# Patient Record
Sex: MALE | Race: WHITE | Employment: OTHER | URBAN - METROPOLITAN AREA
[De-identification: names, ages, dates, MRNs, and addresses within clinical notes are randomized per-mention and may not be internally consistent; named-entity substitution may affect disease eponyms.]

---

## 2017-05-12 ENCOUNTER — OFFICE VISIT (OUTPATIENT)
Dept: INTERNAL MEDICINE CLINIC | Facility: CLINIC | Age: 82
End: 2017-05-12

## 2017-05-12 VITALS
DIASTOLIC BLOOD PRESSURE: 70 MMHG | HEIGHT: 67 IN | OXYGEN SATURATION: 96 % | HEART RATE: 97 BPM | SYSTOLIC BLOOD PRESSURE: 150 MMHG | TEMPERATURE: 98 F | BODY MASS INDEX: 36.57 KG/M2 | WEIGHT: 233 LBS

## 2017-05-12 DIAGNOSIS — R42 VERTIGO: ICD-10-CM

## 2017-05-12 DIAGNOSIS — M72.0 DUPUYTREN'S CONTRACTURE: ICD-10-CM

## 2017-05-12 DIAGNOSIS — Z00.00 PHYSICAL EXAM, ANNUAL: Primary | ICD-10-CM

## 2017-05-12 DIAGNOSIS — I10 ESSENTIAL HYPERTENSION: ICD-10-CM

## 2017-05-12 DIAGNOSIS — Z12.5 SCREENING PSA (PROSTATE SPECIFIC ANTIGEN): ICD-10-CM

## 2017-05-12 DIAGNOSIS — J45.909 REACTIVE AIRWAY DISEASE, UNSPECIFIED ASTHMA SEVERITY, UNCOMPLICATED: ICD-10-CM

## 2017-05-12 DIAGNOSIS — C44.91 BASAL CELL CARCINOMA: ICD-10-CM

## 2017-05-12 DIAGNOSIS — M17.0 OSTEOARTHRITIS OF BOTH KNEES, UNSPECIFIED OSTEOARTHRITIS TYPE: ICD-10-CM

## 2017-05-12 DIAGNOSIS — R73.9 HYPERGLYCEMIA: ICD-10-CM

## 2017-05-12 PROCEDURE — G0439 PPPS, SUBSEQ VISIT: HCPCS | Performed by: INTERNAL MEDICINE

## 2017-05-12 RX ORDER — BENAZEPRIL HYDROCHLORIDE 20 MG/1
20 TABLET ORAL
Qty: 90 TABLET | Refills: 3 | Status: SHIPPED | OUTPATIENT
Start: 2017-05-12 | End: 2018-04-26

## 2017-05-12 RX ORDER — AMLODIPINE BESYLATE 10 MG/1
10 TABLET ORAL DAILY
Qty: 90 TABLET | Refills: 3 | Status: SHIPPED | OUTPATIENT
Start: 2017-05-12 | End: 2018-04-26

## 2017-05-12 RX ORDER — MECLIZINE HYDROCHLORIDE 25 MG/1
25 TABLET ORAL DAILY
Qty: 90 TABLET | Refills: 3 | Status: SHIPPED | OUTPATIENT
Start: 2017-05-12 | End: 2018-04-26

## 2017-05-12 RX ORDER — ALBUTEROL SULFATE 90 UG/1
2 AEROSOL, METERED RESPIRATORY (INHALATION) EVERY 6 HOURS PRN
Qty: 18 INHALER | Refills: 2 | Status: SHIPPED | OUTPATIENT
Start: 2017-05-12 | End: 2018-05-18

## 2017-05-12 NOTE — PROGRESS NOTES
Bibi Cline is a 80year old male.     HPI:   Patient presents with:  Physical: medicare annual phys      81 y/o M here for subsequent Medicare annual wellness exam;  no CP; no SOB; no headaches; no palpitation; weight has increased 11 pounds in the l months, have you lost more than 10 pounds without trying?: 2 - No    Has your appetite been poor?: No         How does the patient maintain a good energy level?: Daily Walks    How would you describe your daily physical activity?: Light    How would you de Do you have a healthcare power of ?: Yes    Do you have a living will?: Yes     Hearing Assessment (Required for AWV/SWV)    Hearing Screening    Time taken:  5/12/2017 10:30 AM   Screening Method:  Whisper Test   Whisper Test Result:  Pass orders found for this or any previous visit.  Update Immunization Activity if applicable    Pneumococcal   Orders placed or performed in visit on 05/05/15  -PNEUMOCOCCAL VACC, 13 TEMI IM    Update Immunization Activity if applicable    Hepatitis B No orders decreased appetite, diarrhea and vomiting; no melena or hematochezia  Musculoskeletal:  Negative for arthralgias or myalgias  Genitourinary:  Negative for dysuria or polyuria  Hema/Lymph:  Negative for easy bleeding and easy bruising  Integumentary:  Negat Visit:  No orders of the defined types were placed in this encounter.        Meds This Visit:    No prescriptions requested or ordered in this encounter    Imaging & Referrals:  None     5/12/2017  Bi Cat MD

## 2017-05-15 ENCOUNTER — LAB ENCOUNTER (OUTPATIENT)
Dept: LAB | Age: 82
End: 2017-05-15
Attending: INTERNAL MEDICINE
Payer: MEDICARE

## 2017-05-15 DIAGNOSIS — I10 ESSENTIAL HYPERTENSION: ICD-10-CM

## 2017-05-15 DIAGNOSIS — R73.9 HYPERGLYCEMIA: ICD-10-CM

## 2017-05-15 DIAGNOSIS — Z12.5 SCREENING PSA (PROSTATE SPECIFIC ANTIGEN): ICD-10-CM

## 2017-05-15 PROCEDURE — 83036 HEMOGLOBIN GLYCOSYLATED A1C: CPT

## 2017-05-15 PROCEDURE — 85025 COMPLETE CBC W/AUTO DIFF WBC: CPT

## 2017-05-15 PROCEDURE — 80053 COMPREHEN METABOLIC PANEL: CPT

## 2017-05-15 PROCEDURE — 36415 COLL VENOUS BLD VENIPUNCTURE: CPT

## 2017-05-15 PROCEDURE — 84443 ASSAY THYROID STIM HORMONE: CPT

## 2017-05-15 PROCEDURE — 80061 LIPID PANEL: CPT

## 2018-04-29 RX ORDER — MECLIZINE HYDROCHLORIDE 25 MG/1
TABLET ORAL
Qty: 90 TABLET | Refills: 0 | Status: SHIPPED | OUTPATIENT
Start: 2018-04-29 | End: 2018-05-18

## 2018-04-29 RX ORDER — BENAZEPRIL HYDROCHLORIDE 20 MG/1
TABLET ORAL
Qty: 90 TABLET | Refills: 0 | Status: SHIPPED | OUTPATIENT
Start: 2018-04-29 | End: 2018-05-18

## 2018-04-29 RX ORDER — AMLODIPINE BESYLATE 10 MG/1
TABLET ORAL
Qty: 90 TABLET | Refills: 0 | Status: SHIPPED | OUTPATIENT
Start: 2018-04-29 | End: 2018-05-18

## 2018-05-18 ENCOUNTER — TELEPHONE (OUTPATIENT)
Dept: INTERNAL MEDICINE CLINIC | Facility: CLINIC | Age: 83
End: 2018-05-18

## 2018-05-18 ENCOUNTER — LAB ENCOUNTER (OUTPATIENT)
Dept: LAB | Age: 83
End: 2018-05-18
Attending: INTERNAL MEDICINE
Payer: MEDICARE

## 2018-05-18 ENCOUNTER — OFFICE VISIT (OUTPATIENT)
Dept: INTERNAL MEDICINE CLINIC | Facility: CLINIC | Age: 83
End: 2018-05-18

## 2018-05-18 VITALS
HEIGHT: 67 IN | BODY MASS INDEX: 37.61 KG/M2 | HEART RATE: 96 BPM | DIASTOLIC BLOOD PRESSURE: 80 MMHG | WEIGHT: 239.63 LBS | SYSTOLIC BLOOD PRESSURE: 148 MMHG | TEMPERATURE: 99 F

## 2018-05-18 DIAGNOSIS — R73.9 HYPERGLYCEMIA: ICD-10-CM

## 2018-05-18 DIAGNOSIS — J06.9 URI, ACUTE: ICD-10-CM

## 2018-05-18 DIAGNOSIS — R42 VERTIGO: ICD-10-CM

## 2018-05-18 DIAGNOSIS — Z12.5 SCREENING PSA (PROSTATE SPECIFIC ANTIGEN): ICD-10-CM

## 2018-05-18 DIAGNOSIS — M72.0 DUPUYTREN'S CONTRACTURE: ICD-10-CM

## 2018-05-18 DIAGNOSIS — Z00.00 PHYSICAL EXAM, ANNUAL: Primary | ICD-10-CM

## 2018-05-18 DIAGNOSIS — I10 ESSENTIAL HYPERTENSION: ICD-10-CM

## 2018-05-18 DIAGNOSIS — C44.91 BASAL CELL CARCINOMA, UNSPECIFIED SITE: ICD-10-CM

## 2018-05-18 DIAGNOSIS — J45.909 REACTIVE AIRWAY DISEASE, UNSPECIFIED ASTHMA SEVERITY, UNCOMPLICATED: ICD-10-CM

## 2018-05-18 DIAGNOSIS — M17.0 OSTEOARTHRITIS OF BOTH KNEES, UNSPECIFIED OSTEOARTHRITIS TYPE: ICD-10-CM

## 2018-05-18 PROCEDURE — G0439 PPPS, SUBSEQ VISIT: HCPCS | Performed by: INTERNAL MEDICINE

## 2018-05-18 PROCEDURE — 80053 COMPREHEN METABOLIC PANEL: CPT

## 2018-05-18 PROCEDURE — 80061 LIPID PANEL: CPT

## 2018-05-18 PROCEDURE — 85025 COMPLETE CBC W/AUTO DIFF WBC: CPT

## 2018-05-18 PROCEDURE — 36415 COLL VENOUS BLD VENIPUNCTURE: CPT

## 2018-05-18 PROCEDURE — 84443 ASSAY THYROID STIM HORMONE: CPT

## 2018-05-18 RX ORDER — ALBUTEROL SULFATE 90 UG/1
2 AEROSOL, METERED RESPIRATORY (INHALATION) EVERY 6 HOURS PRN
Qty: 3 INHALER | Refills: 3 | Status: SHIPPED | OUTPATIENT
Start: 2018-05-18 | End: 2019-05-20

## 2018-05-18 RX ORDER — CEFUROXIME AXETIL 500 MG/1
500 TABLET ORAL 2 TIMES DAILY
Qty: 20 TABLET | Refills: 0 | Status: SHIPPED | OUTPATIENT
Start: 2018-05-18 | End: 2018-08-09

## 2018-05-18 RX ORDER — MECLIZINE HYDROCHLORIDE 25 MG/1
25 TABLET ORAL
Qty: 90 TABLET | Refills: 3 | Status: SHIPPED | OUTPATIENT
Start: 2018-05-18 | End: 2019-05-20

## 2018-05-18 RX ORDER — AMLODIPINE BESYLATE 10 MG/1
10 TABLET ORAL
Qty: 90 TABLET | Refills: 3 | Status: SHIPPED | OUTPATIENT
Start: 2018-05-18 | End: 2018-08-29 | Stop reason: SINTOL

## 2018-05-18 RX ORDER — BENAZEPRIL HYDROCHLORIDE 20 MG/1
20 TABLET ORAL
Qty: 90 TABLET | Refills: 3 | Status: SHIPPED | OUTPATIENT
Start: 2018-05-18 | End: 2019-05-20

## 2018-05-18 RX ORDER — ALBUTEROL SULFATE 90 UG/1
2 AEROSOL, METERED RESPIRATORY (INHALATION) EVERY 6 HOURS PRN
Qty: 3 INHALER | Refills: 3 | Status: SHIPPED | OUTPATIENT
Start: 2018-05-18 | End: 2018-05-18

## 2018-05-18 NOTE — TELEPHONE ENCOUNTER
Message received from [facility] also PA request received from them for Proair inh. Ref# [id number] # [phone]   In purple folder.

## 2018-05-18 NOTE — PROGRESS NOTES
Mckenzie Lujan is a [de-identified]year old male.     HPI:   Patient presents with:  Physical: medicare annual wellness visit  Sore Throat: since monday ( granddaughter had strep)  Medication Request: pending      [de-identified] y/o M here for subsequent Medicare annual wellnes Disp:  Rfl:        Allergies:    Pollen                      Comment:Seasonal allergies        General Health     In the past six months, have you lost more than 10 pounds without trying?: 2 - No    Has your appetite been poor?: No    Type of Diet: Balance Acuity     Right Eye Visual Acuity: Corrected Left Eye Visual Acuity: Corrected   Right Eye Chart Acuity: 20/30 Left Eye Chart Acuity: 20/30     Cognitive Assessment     What day of the week is this?: Correct    What month is it?: Correct    What year is i Pneumococcal   Orders placed or performed in visit on 05/05/15  -PNEUMOCOCCAL VACC, 13 TEMI IM    Update Immunization Activity if applicable    Hepatitis B No orders found for this or any previous visit.  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

## 2018-05-20 ENCOUNTER — APPOINTMENT (OUTPATIENT)
Dept: LAB | Age: 83
End: 2018-05-20
Attending: INTERNAL MEDICINE
Payer: MEDICARE

## 2018-05-20 DIAGNOSIS — Z53.20 COLONOSCOPY REFUSED: ICD-10-CM

## 2018-05-20 DIAGNOSIS — Z12.11 ENCOUNTER FOR SCREENING FOR MALIGNANT NEOPLASM OF COLON: ICD-10-CM

## 2018-05-20 PROCEDURE — 82270 OCCULT BLOOD FECES: CPT

## 2018-05-22 ENCOUNTER — TELEPHONE (OUTPATIENT)
Dept: INTERNAL MEDICINE CLINIC | Facility: CLINIC | Age: 83
End: 2018-05-22

## 2018-05-22 DIAGNOSIS — Z12.11 ENCOUNTER FOR SCREENING FOR MALIGNANT NEOPLASM OF COLON: ICD-10-CM

## 2018-05-22 DIAGNOSIS — Z53.20 COLONOSCOPY REFUSED: Primary | ICD-10-CM

## 2018-05-22 NOTE — TELEPHONE ENCOUNTER
Patient dropped off 1 OB card. Patient was seen in the office by Dr. Rolly Yagn 5/18/18 in which he indicates card was given d/t patient wishing to forego colonoscopy. Order entered and specimen sent to lab.

## 2018-08-06 ENCOUNTER — TELEPHONE (OUTPATIENT)
Dept: INTERNAL MEDICINE CLINIC | Facility: CLINIC | Age: 83
End: 2018-08-06

## 2018-08-06 NOTE — TELEPHONE ENCOUNTER
Pt calling to let  Carinkarishma Tovar know that he has gained some weight, is winded easily lately, and his ankles and legs are getting swollen. Pt wanted to see if  Carinkarishma Tovar would like him to go on a water pill or some other form of medication to help.  Best call back is [phone]

## 2018-08-06 NOTE — TELEPHONE ENCOUNTER
To Dr. Dada Petty - see below - pt states sx have gotten worse in the last week. Has gained about 5 lbs since last visit. Is able to easily fit in shoes, \"just slightly swollen\" - \"wife has me wearing compression stockings\".   Pt states he gets winded if

## 2018-08-07 NOTE — TELEPHONE ENCOUNTER
Dr. Leslie Lot message relayed to pt who verbalized understanding. Appt made for 8:30AM on Thursday, August 9.

## 2018-08-09 ENCOUNTER — OFFICE VISIT (OUTPATIENT)
Dept: INTERNAL MEDICINE CLINIC | Facility: CLINIC | Age: 83
End: 2018-08-09
Payer: MEDICARE

## 2018-08-09 VITALS
HEIGHT: 68 IN | DIASTOLIC BLOOD PRESSURE: 80 MMHG | TEMPERATURE: 99 F | HEART RATE: 95 BPM | BODY MASS INDEX: 37.89 KG/M2 | SYSTOLIC BLOOD PRESSURE: 146 MMHG | WEIGHT: 250 LBS

## 2018-08-09 DIAGNOSIS — I10 ESSENTIAL HYPERTENSION: ICD-10-CM

## 2018-08-09 DIAGNOSIS — R60.0 BILATERAL LOWER EXTREMITY EDEMA: Primary | ICD-10-CM

## 2018-08-09 DIAGNOSIS — R73.9 HYPERGLYCEMIA: ICD-10-CM

## 2018-08-09 PROCEDURE — G0463 HOSPITAL OUTPT CLINIC VISIT: HCPCS | Performed by: INTERNAL MEDICINE

## 2018-08-09 PROCEDURE — 99214 OFFICE O/P EST MOD 30 MIN: CPT | Performed by: INTERNAL MEDICINE

## 2018-08-09 RX ORDER — HYDROCHLOROTHIAZIDE 25 MG/1
25 TABLET ORAL DAILY
Qty: 30 TABLET | Refills: 5 | Status: SHIPPED | OUTPATIENT
Start: 2018-08-09 | End: 2018-10-07

## 2018-08-09 RX ORDER — METOPROLOL SUCCINATE 50 MG/1
50 TABLET, EXTENDED RELEASE ORAL DAILY
Qty: 30 TABLET | Refills: 5 | Status: SHIPPED | OUTPATIENT
Start: 2018-08-09 | End: 2018-10-07

## 2018-08-09 NOTE — PROGRESS NOTES
Eddie Nephew is a [de-identified]year old male. HPI:   Patient presents with:  Edema: Pt complains of bilateral leg and foot edema. His lower legs are red.        [de-identified] y/o M with HTN here for F/U; presents with BLE edema; has gained 11 pounds in 3 months;  no CP; OR 1 TABLET DAILY Disp:  Rfl:        Allergies:    Pollen                      Comment:Seasonal allergies              ROS:   Constitutional: no weight loss; no fatigue  Cardiovascular:  Negative for chest pain; negative palpitations  Respiratory:  Negativ now     PSA screening  PSA 1.2 in May 2017; would advise against PSA screening due to advanced age          RTC 3 weeks                  Orders This Visit:  No orders of the defined types were placed in this encounter.       Meds This Visit:    No prescript

## 2018-08-24 ENCOUNTER — APPOINTMENT (OUTPATIENT)
Dept: LAB | Age: 83
End: 2018-08-24
Attending: INTERNAL MEDICINE
Payer: MEDICARE

## 2018-08-24 DIAGNOSIS — R73.9 HYPERGLYCEMIA: ICD-10-CM

## 2018-08-24 LAB — HBA1C MFR BLD: 5.9 % (ref 4–6)

## 2018-08-24 PROCEDURE — 36415 COLL VENOUS BLD VENIPUNCTURE: CPT

## 2018-08-24 PROCEDURE — 83036 HEMOGLOBIN GLYCOSYLATED A1C: CPT

## 2018-08-30 ENCOUNTER — OFFICE VISIT (OUTPATIENT)
Dept: INTERNAL MEDICINE CLINIC | Facility: CLINIC | Age: 83
End: 2018-08-30
Payer: MEDICARE

## 2018-08-30 VITALS
BODY MASS INDEX: 36.28 KG/M2 | OXYGEN SATURATION: 97 % | WEIGHT: 239.38 LBS | SYSTOLIC BLOOD PRESSURE: 140 MMHG | HEIGHT: 68 IN | DIASTOLIC BLOOD PRESSURE: 78 MMHG | TEMPERATURE: 98 F | HEART RATE: 85 BPM

## 2018-08-30 DIAGNOSIS — R73.9 HYPERGLYCEMIA: ICD-10-CM

## 2018-08-30 DIAGNOSIS — J45.909 REACTIVE AIRWAY DISEASE, UNSPECIFIED ASTHMA SEVERITY, UNCOMPLICATED: ICD-10-CM

## 2018-08-30 DIAGNOSIS — I10 ESSENTIAL HYPERTENSION: ICD-10-CM

## 2018-08-30 DIAGNOSIS — R60.0 BILATERAL LOWER EXTREMITY EDEMA: Primary | ICD-10-CM

## 2018-08-30 PROCEDURE — G0463 HOSPITAL OUTPT CLINIC VISIT: HCPCS | Performed by: INTERNAL MEDICINE

## 2018-08-30 PROCEDURE — 99214 OFFICE O/P EST MOD 30 MIN: CPT | Performed by: INTERNAL MEDICINE

## 2018-08-30 NOTE — PROGRESS NOTES
Harmony Escobar is a [de-identified]year old male. HPI:   Patient presents with:   Follow - Up: 3 week BLE edema       [de-identified] y/o M with HTN, LE edema here for F/U; leg edema resolved after stopping amlodipine;  no CP; no SOB; no headaches; no palpitation         HIST hours as needed for Wheezing.  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

## 2018-10-07 RX ORDER — BENAZEPRIL HYDROCHLORIDE 20 MG/1
20 TABLET ORAL
Qty: 90 TABLET | Refills: 3 | Status: CANCELLED | OUTPATIENT
Start: 2018-10-07

## 2018-10-07 RX ORDER — MECLIZINE HYDROCHLORIDE 25 MG/1
25 TABLET ORAL
Qty: 90 TABLET | Refills: 3 | Status: CANCELLED | OUTPATIENT
Start: 2018-10-07

## 2018-10-08 RX ORDER — METOPROLOL SUCCINATE 50 MG/1
50 TABLET, EXTENDED RELEASE ORAL DAILY
Qty: 90 TABLET | Refills: 3 | Status: SHIPPED | OUTPATIENT
Start: 2018-10-08 | End: 2019-11-20

## 2018-10-08 RX ORDER — HYDROCHLOROTHIAZIDE 25 MG/1
25 TABLET ORAL DAILY
Qty: 90 TABLET | Refills: 3 | Status: SHIPPED | OUTPATIENT
Start: 2018-10-08 | End: 2019-11-20

## 2018-12-14 RX ORDER — HYDROCHLOROTHIAZIDE 25 MG/1
TABLET ORAL
Qty: 30 TABLET | Refills: 4 | OUTPATIENT
Start: 2018-12-14

## 2018-12-14 RX ORDER — METOPROLOL SUCCINATE 50 MG/1
TABLET, EXTENDED RELEASE ORAL
Qty: 30 TABLET | Refills: 4 | OUTPATIENT
Start: 2018-12-14

## 2018-12-14 NOTE — TELEPHONE ENCOUNTER
Refill requests received for HCTZ 25mg and metoprolol 25mg from [facility]. Refill authorization sent 10/8/18 for both medications #90 with 3 refills to [facility]. Request is too early per protocol.

## 2019-02-22 ENCOUNTER — OFFICE VISIT (OUTPATIENT)
Dept: INTERNAL MEDICINE CLINIC | Facility: CLINIC | Age: 84
End: 2019-02-22
Payer: MEDICARE

## 2019-02-22 VITALS
WEIGHT: 245 LBS | SYSTOLIC BLOOD PRESSURE: 144 MMHG | BODY MASS INDEX: 37.13 KG/M2 | HEIGHT: 68 IN | RESPIRATION RATE: 18 BRPM | TEMPERATURE: 98 F | HEART RATE: 91 BPM | DIASTOLIC BLOOD PRESSURE: 70 MMHG | OXYGEN SATURATION: 94 %

## 2019-02-22 DIAGNOSIS — R60.0 BILATERAL LOWER EXTREMITY EDEMA: Primary | ICD-10-CM

## 2019-02-22 DIAGNOSIS — I10 ESSENTIAL HYPERTENSION: ICD-10-CM

## 2019-02-22 DIAGNOSIS — R25.1 TREMOR OF BOTH HANDS: ICD-10-CM

## 2019-02-22 DIAGNOSIS — R73.9 HYPERGLYCEMIA: ICD-10-CM

## 2019-02-22 PROCEDURE — G0463 HOSPITAL OUTPT CLINIC VISIT: HCPCS | Performed by: INTERNAL MEDICINE

## 2019-02-22 PROCEDURE — 99214 OFFICE O/P EST MOD 30 MIN: CPT | Performed by: INTERNAL MEDICINE

## 2019-02-22 PROCEDURE — G0009 ADMIN PNEUMOCOCCAL VACCINE: HCPCS | Performed by: INTERNAL MEDICINE

## 2019-02-22 PROCEDURE — 90732 PPSV23 VACC 2 YRS+ SUBQ/IM: CPT | Performed by: INTERNAL MEDICINE

## 2019-02-22 RX ORDER — A/SINGAPORE/GP1908/2015 IVR-180 (H1N1) (AN A/MICHIGAN/45/2015 (H1N1)PDM09-LIKE VIRUS), A/HONG KONG/4801/2014, NYMC X-263B (H3N2) (AN A/HONG KONG/4801/2014-LIKE VIRUS), AND B/BRISBANE/60/2008, WILD TYPE (A B/BRISBANE/60/2008-LIKE VIRUS) 15; 15; 15 UG/.5ML; UG/.5ML; UG/.5ML
INJECTION, SUSPENSION INTRAMUSCULAR
Refills: 0 | COMMUNITY
Start: 2018-10-02 | End: 2019-02-22 | Stop reason: ALTCHOICE

## 2019-02-22 NOTE — PROGRESS NOTES
Cesario Zelaya is a 82year old male. HPI:   Patient presents with: Follow - Up: 6 month follow up, patient states both his has been shaking in past 3 months off/on while doing thing. Denies numbness or pain.        83 y/o M with c/o hand tremors;  n tablet (20 mg total) by mouth once daily. Disp: 90 tablet Rfl: 3   Albuterol Sulfate  (90 Base) MCG/ACT Inhalation Aero Soln Inhale 2 puffs into the lungs every 6 (six) hours as needed for Wheezing.  Disp: 3 Inhaler Rfl: 3   BABY ASPIRIN OR 1 TABLET prn     Basal cell carcinoma  Of nose, and right neck area; s/p s/p Mohs for 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

## 2019-05-13 ENCOUNTER — LAB ENCOUNTER (OUTPATIENT)
Dept: LAB | Age: 84
End: 2019-05-13
Attending: INTERNAL MEDICINE
Payer: MEDICARE

## 2019-05-13 DIAGNOSIS — I10 ESSENTIAL HYPERTENSION: ICD-10-CM

## 2019-05-13 DIAGNOSIS — R73.9 HYPERGLYCEMIA: ICD-10-CM

## 2019-05-13 PROCEDURE — 80053 COMPREHEN METABOLIC PANEL: CPT

## 2019-05-13 PROCEDURE — 85025 COMPLETE CBC W/AUTO DIFF WBC: CPT

## 2019-05-13 PROCEDURE — 36415 COLL VENOUS BLD VENIPUNCTURE: CPT

## 2019-05-13 PROCEDURE — 80061 LIPID PANEL: CPT

## 2019-05-13 PROCEDURE — 83036 HEMOGLOBIN GLYCOSYLATED A1C: CPT

## 2019-05-13 PROCEDURE — 84443 ASSAY THYROID STIM HORMONE: CPT

## 2019-05-20 ENCOUNTER — OFFICE VISIT (OUTPATIENT)
Dept: INTERNAL MEDICINE CLINIC | Facility: CLINIC | Age: 84
End: 2019-05-20
Payer: MEDICARE

## 2019-05-20 VITALS
DIASTOLIC BLOOD PRESSURE: 86 MMHG | BODY MASS INDEX: 38.02 KG/M2 | OXYGEN SATURATION: 93 % | TEMPERATURE: 98 F | HEIGHT: 67.7 IN | SYSTOLIC BLOOD PRESSURE: 144 MMHG | WEIGHT: 248 LBS | HEART RATE: 88 BPM

## 2019-05-20 DIAGNOSIS — N18.30 CKD (CHRONIC KIDNEY DISEASE), STAGE III (HCC): ICD-10-CM

## 2019-05-20 DIAGNOSIS — Z12.5 SCREENING PSA (PROSTATE SPECIFIC ANTIGEN): ICD-10-CM

## 2019-05-20 DIAGNOSIS — R42 VERTIGO: ICD-10-CM

## 2019-05-20 DIAGNOSIS — M72.0 DUPUYTREN'S CONTRACTURE: ICD-10-CM

## 2019-05-20 DIAGNOSIS — R60.0 BILATERAL LOWER EXTREMITY EDEMA: ICD-10-CM

## 2019-05-20 DIAGNOSIS — R73.9 HYPERGLYCEMIA: ICD-10-CM

## 2019-05-20 DIAGNOSIS — I10 ESSENTIAL HYPERTENSION: ICD-10-CM

## 2019-05-20 DIAGNOSIS — Z00.00 PHYSICAL EXAM, ANNUAL: Primary | ICD-10-CM

## 2019-05-20 DIAGNOSIS — M17.0 OSTEOARTHRITIS OF BOTH KNEES, UNSPECIFIED OSTEOARTHRITIS TYPE: ICD-10-CM

## 2019-05-20 DIAGNOSIS — E04.1 LEFT THYROID NODULE: ICD-10-CM

## 2019-05-20 DIAGNOSIS — J45.909 REACTIVE AIRWAY DISEASE, UNSPECIFIED ASTHMA SEVERITY, UNCOMPLICATED: ICD-10-CM

## 2019-05-20 DIAGNOSIS — R25.1 TREMOR OF BOTH HANDS: ICD-10-CM

## 2019-05-20 PROCEDURE — G0439 PPPS, SUBSEQ VISIT: HCPCS | Performed by: INTERNAL MEDICINE

## 2019-05-20 RX ORDER — BENAZEPRIL HYDROCHLORIDE 20 MG/1
20 TABLET ORAL
Qty: 90 TABLET | Refills: 3 | Status: SHIPPED | OUTPATIENT
Start: 2019-05-20 | End: 2020-01-31

## 2019-05-20 RX ORDER — FUROSEMIDE 20 MG/1
20 TABLET ORAL DAILY PRN
Qty: 30 TABLET | Refills: 2 | Status: ON HOLD | OUTPATIENT
Start: 2019-05-20 | End: 2020-01-24

## 2019-05-20 RX ORDER — MECLIZINE HYDROCHLORIDE 25 MG/1
25 TABLET ORAL
Qty: 90 TABLET | Refills: 3 | Status: SHIPPED | OUTPATIENT
Start: 2019-05-20 | End: 2020-05-18

## 2019-05-20 RX ORDER — ALBUTEROL SULFATE 90 UG/1
2 AEROSOL, METERED RESPIRATORY (INHALATION) EVERY 6 HOURS PRN
Qty: 1 INHALER | Refills: 5 | Status: SHIPPED | OUTPATIENT
Start: 2019-05-20 | End: 2020-05-26

## 2019-05-20 NOTE — PROGRESS NOTES
Jostin Mak is a 82year old male. HPI:   Patient presents with:  Physical: Medicare annual. Patient reports he has been noticing that his hands shake when he is eating meals or when he has to hold objects steady.    Swelling: Patient reports he ha Tablet 24 Hr Take 1 tablet (50 mg total) by mouth daily. Disp: 90 tablet Rfl: 3   Meclizine HCl 25 MG Oral Tab Take 1 tablet (25 mg total) by mouth once daily.  Disp: 90 tablet Rfl: 3   Benazepril HCl 20 MG Oral Tab Take 1 tablet (20 mg total) by mouth once 12 months?: 0-No    Do you accidently lose urine?: (P) 0-No    Do you have difficulty seeing?: (P) 0-No    Do you have any difficulty walking or getting up?: (P) 1-Yes    Do you have any tripping hazards?: (P) 0-No    Are you on multiple medications?: (P) Visual Acuity     Right Eye Visual Acuity: Corrected Left Eye Visual Acuity: Corrected   Right Eye Chart Acuity: 20/40 Left Eye Chart Acuity: 20/30     Cognitive Assessment     What day of the week is this?: Correct    What month is it?: Correct    W PNEUMOCOCCAL VACC, 13 TEMI IM    Update Immunization Activity if applicable    Hepatitis B No orders found for this or any previous visit. Update Immunization Activity if applicable    Tetanus No orders found for this or any previous visit.  Update Immunizat myalgias  Genitourinary:  Negative for dysuria or polyuria  Hema/Lymph:  Negative for easy bleeding and easy bruising  Integumentary:  Negative for pruritus and rash  Neurological:  Negative for gait disturbance; negative for paresthesias   All other revie seasonal bronchospasm for which he takes albuterol HFA 2 p QID prn     Basal cell carcinoma  Of nose, and right neck area; s/p s/p Mohs for [address] done 8/28/17 with resection by dermatologist Dr Bradley Higurea's contracture  Of both hands; monitor for no

## 2019-05-23 ENCOUNTER — HOSPITAL ENCOUNTER (OUTPATIENT)
Dept: ULTRASOUND IMAGING | Facility: HOSPITAL | Age: 84
Discharge: HOME | End: 2019-05-23
Attending: INTERNAL MEDICINE
Payer: MEDICARE

## 2019-05-23 DIAGNOSIS — E04.1 LEFT THYROID NODULE: ICD-10-CM

## 2019-05-23 PROCEDURE — 76536 US EXAM OF HEAD AND NECK: CPT | Performed by: INTERNAL MEDICINE

## 2019-05-24 ENCOUNTER — TELEPHONE (OUTPATIENT)
Dept: INTERNAL MEDICINE CLINIC | Facility: CLINIC | Age: 84
End: 2019-05-24

## 2019-05-24 NOTE — TELEPHONE ENCOUNTER
Thyroid U/S on 5/23/19 shows Benign-appearing bilateral subcentimeter cystic thyroid nodules; pt notified

## 2019-11-08 RX ORDER — MECLIZINE HYDROCHLORIDE 25 MG/1
25 TABLET ORAL
Qty: 90 TABLET | Refills: 3 | OUTPATIENT
Start: 2019-11-08

## 2019-11-08 NOTE — TELEPHONE ENCOUNTER
Current refill request refused due to refill is either a duplicate request or has active refills at the pharmacy. Check previous templates.     Requested Prescriptions     Refused Prescriptions Disp Refills   • Meclizine HCl 25 MG Oral Tab 90 tablet 3

## 2019-11-20 ENCOUNTER — OFFICE VISIT (OUTPATIENT)
Dept: INTERNAL MEDICINE CLINIC | Facility: CLINIC | Age: 84
End: 2019-11-20
Payer: MEDICARE

## 2019-11-20 ENCOUNTER — APPOINTMENT (OUTPATIENT)
Dept: LAB | Age: 84
End: 2019-11-20
Attending: INTERNAL MEDICINE
Payer: MEDICARE

## 2019-11-20 VITALS
OXYGEN SATURATION: 97 % | HEART RATE: 85 BPM | WEIGHT: 249.81 LBS | HEIGHT: 67.7 IN | DIASTOLIC BLOOD PRESSURE: 74 MMHG | SYSTOLIC BLOOD PRESSURE: 130 MMHG | BODY MASS INDEX: 38.3 KG/M2 | TEMPERATURE: 98 F

## 2019-11-20 DIAGNOSIS — I10 ESSENTIAL HYPERTENSION: ICD-10-CM

## 2019-11-20 DIAGNOSIS — E04.1 LEFT THYROID NODULE: ICD-10-CM

## 2019-11-20 DIAGNOSIS — N18.30 CKD (CHRONIC KIDNEY DISEASE), STAGE III (HCC): ICD-10-CM

## 2019-11-20 DIAGNOSIS — R73.9 HYPERGLYCEMIA: ICD-10-CM

## 2019-11-20 DIAGNOSIS — R60.0 BILATERAL LOWER EXTREMITY EDEMA: Primary | ICD-10-CM

## 2019-11-20 DIAGNOSIS — R25.1 TREMOR OF BOTH HANDS: ICD-10-CM

## 2019-11-20 DIAGNOSIS — M17.0 OSTEOARTHRITIS OF BOTH KNEES, UNSPECIFIED OSTEOARTHRITIS TYPE: ICD-10-CM

## 2019-11-20 PROCEDURE — 90662 IIV NO PRSV INCREASED AG IM: CPT | Performed by: INTERNAL MEDICINE

## 2019-11-20 PROCEDURE — 36415 COLL VENOUS BLD VENIPUNCTURE: CPT

## 2019-11-20 PROCEDURE — 83036 HEMOGLOBIN GLYCOSYLATED A1C: CPT

## 2019-11-20 PROCEDURE — G0463 HOSPITAL OUTPT CLINIC VISIT: HCPCS | Performed by: INTERNAL MEDICINE

## 2019-11-20 PROCEDURE — G0008 ADMIN INFLUENZA VIRUS VAC: HCPCS | Performed by: INTERNAL MEDICINE

## 2019-11-20 PROCEDURE — 99214 OFFICE O/P EST MOD 30 MIN: CPT | Performed by: INTERNAL MEDICINE

## 2019-11-20 PROCEDURE — 80048 BASIC METABOLIC PNL TOTAL CA: CPT

## 2019-11-20 RX ORDER — HYDROCHLOROTHIAZIDE 25 MG/1
25 TABLET ORAL DAILY
Qty: 90 TABLET | Refills: 3 | Status: ON HOLD | OUTPATIENT
Start: 2019-11-20 | End: 2020-01-24

## 2019-11-20 RX ORDER — METOPROLOL SUCCINATE 50 MG/1
50 TABLET, EXTENDED RELEASE ORAL DAILY
Qty: 90 TABLET | Refills: 3 | Status: SHIPPED | OUTPATIENT
Start: 2019-11-20 | End: 2020-11-02

## 2019-11-20 NOTE — PROGRESS NOTES
Troy Amin is a 82year old male. HPI:   Patient presents with:  Checkup: 6 month. c/o burning to feet/toes when bed sheets toes skin, onset: 1 month. Denies tingling/numbness.        81 y/o M with Benign essential hypertension here for F/U;  on   puffs into the lungs every 6 (six) hours as needed for Wheezing. 1 Inhaler 5   • Meclizine HCl 25 MG Oral Tab Take 1 tablet (25 mg total) by mouth once daily. 90 tablet 3   • Benazepril HCl 20 MG Oral Tab Take 1 tablet (20 mg total) by mouth once daily.  82 Aleve 220 mg po BID; pt is reluctant to pursue TKR     Hyperglycemia  ; A1C 6.0% in May 2019; +FHx DM (mother); re-check A1C      Reactive airway disease  Has seasonal bronchospasm for which he takes albuterol HFA 2 p QID prn     Basal cell carcinom

## 2020-01-13 ENCOUNTER — TELEPHONE (OUTPATIENT)
Dept: INTERNAL MEDICINE CLINIC | Facility: CLINIC | Age: 85
End: 2020-01-13

## 2020-01-13 RX ORDER — METHYLPREDNISOLONE 4 MG/1
TABLET ORAL
Qty: 21 TABLET | Refills: 0 | Status: SHIPPED | OUTPATIENT
Start: 2020-01-13 | End: 2020-01-22

## 2020-01-13 NOTE — TELEPHONE ENCOUNTER
Pt. Is having a gout flare up that moved from his feet to his hand pt. Would like to be prescribed something ph.  # [phone]   [facility] # [phone]  Routed to clinical

## 2020-01-13 NOTE — TELEPHONE ENCOUNTER
I spoke with patient and he has had gout pain in his hand, index finger and middle finger for past 4 days. Usually he has gout symptoms in his feet and this is new for him. He denies any injury to hand. Fingers look red and inflamed to him.  He usually [place]

## 2020-01-13 NOTE — TELEPHONE ENCOUNTER
Called and Relayed MD's message to patient---verbalized understanding. Informed that RX has been sent to his pharmacy.

## 2020-01-22 ENCOUNTER — OFFICE VISIT (OUTPATIENT)
Dept: INTERNAL MEDICINE CLINIC | Facility: CLINIC | Age: 85
End: 2020-01-22
Payer: MEDICARE

## 2020-01-22 ENCOUNTER — APPOINTMENT (OUTPATIENT)
Dept: LAB | Age: 85
End: 2020-01-22
Attending: INTERNAL MEDICINE
Payer: MEDICARE

## 2020-01-22 VITALS
OXYGEN SATURATION: 94 % | SYSTOLIC BLOOD PRESSURE: 104 MMHG | TEMPERATURE: 98 F | HEART RATE: 92 BPM | WEIGHT: 250 LBS | DIASTOLIC BLOOD PRESSURE: 60 MMHG | BODY MASS INDEX: 38.33 KG/M2 | HEIGHT: 67.7 IN

## 2020-01-22 DIAGNOSIS — M10.9 ACUTE GOUT OF RIGHT HAND, UNSPECIFIED CAUSE: Primary | ICD-10-CM

## 2020-01-22 DIAGNOSIS — M10.9 ACUTE GOUT OF RIGHT HAND, UNSPECIFIED CAUSE: ICD-10-CM

## 2020-01-22 DIAGNOSIS — N18.30 CKD (CHRONIC KIDNEY DISEASE), STAGE III (HCC): ICD-10-CM

## 2020-01-22 DIAGNOSIS — I10 ESSENTIAL HYPERTENSION: ICD-10-CM

## 2020-01-22 LAB — URATE SERPL-MCNC: 11.7 MG/DL (ref 3.5–7.2)

## 2020-01-22 PROCEDURE — 99214 OFFICE O/P EST MOD 30 MIN: CPT | Performed by: INTERNAL MEDICINE

## 2020-01-22 PROCEDURE — 84550 ASSAY OF BLOOD/URIC ACID: CPT

## 2020-01-22 PROCEDURE — 36415 COLL VENOUS BLD VENIPUNCTURE: CPT

## 2020-01-22 PROCEDURE — G0463 HOSPITAL OUTPT CLINIC VISIT: HCPCS | Performed by: INTERNAL MEDICINE

## 2020-01-22 NOTE — PROGRESS NOTES
Mason Kaur is a 82year old male. HPI:   Patient presents with:   Follow - Up: prednisone helped for gout in hands      83 y/o M who developed acute gout to DIP of right index finger about 10 d ago; he took Medrol Dose loraine x 6d with marked lesseni Sulfate  (90 Base) MCG/ACT Inhalation Aero Soln Inhale 2 puffs into the lungs every 6 (six) hours as needed for Wheezing. 1 Inhaler 5   • Meclizine HCl 25 MG Oral Tab Take 1 tablet (25 mg total) by mouth once daily.  90 tablet 3   • Benazepril HCl 20 tremor     BPV  uses meclizine 25 mg po qD on regular basis     OA knees  Takes Aleve 220 mg po BID; pt is reluctant to pursue TKR     Hyperglycemia  F7BS 129; A1C 6.0% in May 2019; +FHx DM (mother); re-check A1C      Reactive airway disease  Has seasonal

## 2020-01-23 ENCOUNTER — APPOINTMENT (OUTPATIENT)
Dept: GENERAL RADIOLOGY | Facility: HOSPITAL | Age: 85
End: 2020-01-23
Attending: EMERGENCY MEDICINE
Payer: MEDICARE

## 2020-01-23 ENCOUNTER — HOSPITAL ENCOUNTER (OUTPATIENT)
Facility: HOSPITAL | Age: 85
Setting detail: OBSERVATION
LOS: 1 days | Discharge: HOME | End: 2020-01-24
Attending: EMERGENCY MEDICINE | Admitting: INTERNAL MEDICINE
Payer: MEDICARE

## 2020-01-23 ENCOUNTER — APPOINTMENT (OUTPATIENT)
Dept: CT IMAGING | Facility: HOSPITAL | Age: 85
End: 2020-01-23
Attending: EMERGENCY MEDICINE
Payer: MEDICARE

## 2020-01-23 DIAGNOSIS — N18.30 CKD (CHRONIC KIDNEY DISEASE), STAGE III (HCC): ICD-10-CM

## 2020-01-23 DIAGNOSIS — R07.9 CHEST PAIN WITH HIGH RISK FOR CARDIAC ETIOLOGY: Primary | ICD-10-CM

## 2020-01-23 DIAGNOSIS — J40 BRONCHITIS: ICD-10-CM

## 2020-01-23 PROBLEM — E87.3 METABOLIC ALKALOSIS: Status: ACTIVE | Noted: 2020-01-23

## 2020-01-23 PROBLEM — D69.6 THROMBOCYTOPENIA (HCC): Status: ACTIVE | Noted: 2020-01-23

## 2020-01-23 LAB
ANION GAP SERPL CALC-SCNC: 3 MMOL/L (ref 0–18)
BASOPHILS # BLD AUTO: 0.05 X10(3) UL (ref 0–0.2)
BASOPHILS NFR BLD AUTO: 0.6 %
BUN BLD-MCNC: 28 MG/DL (ref 7–18)
BUN/CREAT SERPL: 16.8 (ref 10–20)
CALCIUM BLD-MCNC: 8.6 MG/DL (ref 8.5–10.1)
CHLORIDE SERPL-SCNC: 102 MMOL/L (ref 98–112)
CO2 SERPL-SCNC: 34 MMOL/L (ref 21–32)
CREAT BLD-MCNC: 1.67 MG/DL (ref 0.7–1.3)
D DIMER PPP FEU-MCNC: 1.67 UG/ML FEU (ref ?–0.82)
DEPRECATED RDW RBC AUTO: 45.3 FL (ref 35.1–46.3)
EOSINOPHIL # BLD AUTO: 0.15 X10(3) UL (ref 0–0.7)
EOSINOPHIL NFR BLD AUTO: 1.7 %
ERYTHROCYTE [DISTWIDTH] IN BLOOD BY AUTOMATED COUNT: 12.7 % (ref 11–15)
GLUCOSE BLD-MCNC: 149 MG/DL (ref 70–99)
HCT VFR BLD AUTO: 43.6 % (ref 39–53)
HGB BLD-MCNC: 14.9 G/DL (ref 13–17.5)
IMM GRANULOCYTES # BLD AUTO: 0.03 X10(3) UL (ref 0–1)
IMM GRANULOCYTES NFR BLD: 0.3 %
LYMPHOCYTES # BLD AUTO: 2.1 X10(3) UL (ref 1–4)
LYMPHOCYTES NFR BLD AUTO: 24.1 %
MCH RBC QN AUTO: 33.2 PG (ref 26–34)
MCHC RBC AUTO-ENTMCNC: 34.2 G/DL (ref 31–37)
MCV RBC AUTO: 97.1 FL (ref 80–100)
MONOCYTES # BLD AUTO: 0.76 X10(3) UL (ref 0.1–1)
MONOCYTES NFR BLD AUTO: 8.7 %
NEUTROPHILS # BLD AUTO: 5.61 X10 (3) UL (ref 1.5–7.7)
NEUTROPHILS # BLD AUTO: 5.61 X10(3) UL (ref 1.5–7.7)
NEUTROPHILS NFR BLD AUTO: 64.6 %
OSMOLALITY SERPL CALC.SUM OF ELEC: 296 MOSM/KG (ref 275–295)
PLATELET # BLD AUTO: 143 10(3)UL (ref 150–450)
POTASSIUM SERPL-SCNC: 4.4 MMOL/L (ref 3.5–5.1)
RBC # BLD AUTO: 4.49 X10(6)UL (ref 3.8–5.8)
SODIUM SERPL-SCNC: 139 MMOL/L (ref 136–145)
TROPONIN I SERPL-MCNC: <0.045 NG/ML (ref ?–0.04)
TROPONIN I SERPL-MCNC: <0.045 NG/ML (ref ?–0.04)
WBC # BLD AUTO: 8.7 X10(3) UL (ref 4–11)

## 2020-01-23 PROCEDURE — 71046 X-RAY EXAM CHEST 2 VIEWS: CPT | Performed by: EMERGENCY MEDICINE

## 2020-01-23 PROCEDURE — 71260 CT THORAX DX C+: CPT | Performed by: EMERGENCY MEDICINE

## 2020-01-23 RX ORDER — LISINOPRIL 20 MG/1
20 TABLET ORAL DAILY
Status: DISCONTINUED | OUTPATIENT
Start: 2020-01-24 | End: 2020-01-24

## 2020-01-23 RX ORDER — MECLIZINE HYDROCHLORIDE 25 MG/1
25 TABLET ORAL NIGHTLY
Status: DISCONTINUED | OUTPATIENT
Start: 2020-01-23 | End: 2020-01-24

## 2020-01-23 RX ORDER — ALBUTEROL SULFATE 90 UG/1
2 AEROSOL, METERED RESPIRATORY (INHALATION) EVERY 6 HOURS PRN
Status: DISCONTINUED | OUTPATIENT
Start: 2020-01-23 | End: 2020-01-24

## 2020-01-23 RX ORDER — ASPIRIN 81 MG/1
81 TABLET ORAL NIGHTLY
Status: DISCONTINUED | OUTPATIENT
Start: 2020-01-23 | End: 2020-01-24

## 2020-01-23 RX ORDER — SODIUM CHLORIDE 9 MG/ML
INJECTION, SOLUTION INTRAVENOUS CONTINUOUS
Status: DISCONTINUED | OUTPATIENT
Start: 2020-01-23 | End: 2020-01-24

## 2020-01-23 RX ORDER — HYDROCHLOROTHIAZIDE 25 MG/1
25 TABLET ORAL DAILY
Status: DISCONTINUED | OUTPATIENT
Start: 2020-01-24 | End: 2020-01-24

## 2020-01-23 NOTE — ED INITIAL ASSESSMENT (HPI)
Pt reports sudden onset of chest pain at 0430 reports taking 3 max asa this am   Pt is sob in triage but states this is normal for him.

## 2020-01-23 NOTE — ED NOTES
Patient ambulatory to bathroom with cane for assistance. Ambulates well.  CT tech at bedside to take patient to CT

## 2020-01-24 ENCOUNTER — APPOINTMENT (OUTPATIENT)
Dept: NUCLEAR MEDICINE | Facility: HOSPITAL | Age: 85
End: 2020-01-24
Attending: INTERNAL MEDICINE
Payer: MEDICARE

## 2020-01-24 ENCOUNTER — APPOINTMENT (OUTPATIENT)
Dept: CV DIAGNOSTICS | Facility: HOSPITAL | Age: 85
End: 2020-01-24
Attending: INTERNAL MEDICINE
Payer: MEDICARE

## 2020-01-24 ENCOUNTER — TELEPHONE (OUTPATIENT)
Dept: INTERNAL MEDICINE CLINIC | Facility: CLINIC | Age: 85
End: 2020-01-24

## 2020-01-24 VITALS
SYSTOLIC BLOOD PRESSURE: 117 MMHG | BODY MASS INDEX: 34.76 KG/M2 | WEIGHT: 242.81 LBS | OXYGEN SATURATION: 93 % | RESPIRATION RATE: 18 BRPM | HEART RATE: 92 BPM | TEMPERATURE: 98 F | DIASTOLIC BLOOD PRESSURE: 74 MMHG | HEIGHT: 70 IN

## 2020-01-24 DIAGNOSIS — N18.30 CKD (CHRONIC KIDNEY DISEASE), STAGE III (HCC): Primary | ICD-10-CM

## 2020-01-24 LAB
ALBUMIN SERPL-MCNC: 3.3 G/DL (ref 3.4–5)
ALBUMIN/GLOB SERPL: 0.9 {RATIO} (ref 1–2)
ALP LIVER SERPL-CCNC: 62 U/L (ref 45–117)
ALT SERPL-CCNC: 28 U/L (ref 16–61)
ALT SERPL-CCNC: 31 U/L (ref 16–61)
ANION GAP SERPL CALC-SCNC: 4 MMOL/L (ref 0–18)
AST SERPL-CCNC: 24 U/L (ref 15–37)
BASOPHILS # BLD AUTO: 0.06 X10(3) UL (ref 0–0.2)
BASOPHILS NFR BLD AUTO: 0.9 %
BILIRUB SERPL-MCNC: 0.7 MG/DL (ref 0.1–2)
BILIRUB SERPL-MCNC: 0.7 MG/DL (ref 0.1–2)
BUN BLD-MCNC: 23 MG/DL (ref 7–18)
BUN/CREAT SERPL: 15.5 (ref 10–20)
CALCIUM BLD-MCNC: 8.5 MG/DL (ref 8.5–10.1)
CHLORIDE SERPL-SCNC: 103 MMOL/L (ref 98–112)
CO2 SERPL-SCNC: 32 MMOL/L (ref 21–32)
CREAT BLD-MCNC: 1.48 MG/DL (ref 0.7–1.3)
DEPRECATED RDW RBC AUTO: 43.2 FL (ref 35.1–46.3)
EOSINOPHIL # BLD AUTO: 0.17 X10(3) UL (ref 0–0.7)
EOSINOPHIL NFR BLD AUTO: 2.6 %
ERYTHROCYTE [DISTWIDTH] IN BLOOD BY AUTOMATED COUNT: 12.4 % (ref 11–15)
GLOBULIN PLAS-MCNC: 3.7 G/DL (ref 2.8–4.4)
GLUCOSE BLD-MCNC: 132 MG/DL (ref 70–99)
HCT VFR BLD AUTO: 41.5 % (ref 39–53)
HGB BLD-MCNC: 14.5 G/DL (ref 13–17.5)
IMM GRANULOCYTES # BLD AUTO: 0.02 X10(3) UL (ref 0–1)
IMM GRANULOCYTES NFR BLD: 0.3 %
LYMPHOCYTES # BLD AUTO: 2.14 X10(3) UL (ref 1–4)
LYMPHOCYTES NFR BLD AUTO: 32.8 %
M PROTEIN MFR SERPL ELPH: 7 G/DL (ref 6.4–8.2)
MCH RBC QN AUTO: 33.3 PG (ref 26–34)
MCHC RBC AUTO-ENTMCNC: 34.9 G/DL (ref 31–37)
MCV RBC AUTO: 95.4 FL (ref 80–100)
MONOCYTES # BLD AUTO: 0.65 X10(3) UL (ref 0.1–1)
MONOCYTES NFR BLD AUTO: 10 %
NEUTROPHILS # BLD AUTO: 3.48 X10 (3) UL (ref 1.5–7.7)
NEUTROPHILS # BLD AUTO: 3.48 X10(3) UL (ref 1.5–7.7)
NEUTROPHILS NFR BLD AUTO: 53.4 %
OSMOLALITY SERPL CALC.SUM OF ELEC: 294 MOSM/KG (ref 275–295)
PLATELET # BLD AUTO: 138 10(3)UL (ref 150–450)
POTASSIUM SERPL-SCNC: 4 MMOL/L (ref 3.5–5.1)
RBC # BLD AUTO: 4.35 X10(6)UL (ref 3.8–5.8)
SODIUM SERPL-SCNC: 139 MMOL/L (ref 136–145)
WBC # BLD AUTO: 6.5 X10(3) UL (ref 4–11)

## 2020-01-24 PROCEDURE — 93017 CV STRESS TEST TRACING ONLY: CPT | Performed by: INTERNAL MEDICINE

## 2020-01-24 PROCEDURE — 78452 HT MUSCLE IMAGE SPECT MULT: CPT | Performed by: INTERNAL MEDICINE

## 2020-01-24 PROCEDURE — 99220 INITIAL OBSERVATION CARE,LEVL III: CPT | Performed by: INTERNAL MEDICINE

## 2020-01-24 NOTE — CM/SW NOTE
COND 44: Patient failed Inpatient criteria. Second level of review completed and supports Observation. UR committee in agreement. Discussed with Dr Ok Pena approves.

## 2020-01-24 NOTE — DIETARY NOTE
NUTRITION EDUCATION NOTE    Received consult for nutrition education. Appropriate education and handout provided. See education section of Epic for specifics.     Xiomy Caballero RDN, LDN   Clinical Nutrition  Ext [phone]

## 2020-01-24 NOTE — PLAN OF CARE
Patient is eager to go home; vital signs stable; awaiting [facility] to see patient prior to discharge. D/C instructions will be reviewed and discussed. Patient resting comfortably in chair; locked, shoes on, call light within reach. Will continue to monitor. Discharge  Goal: Absence of cardiac arrhythmias or at baseline  Description  INTERVENTIONS:  - Continuous cardiac monitoring, monitor vital signs, obtain 12 lead EKG if indicated  - Evaluate effectiveness of antiarrhythmic and heart rate control medication

## 2020-01-24 NOTE — PLAN OF CARE
Pt comes in with CP, all pain resolved in ER, plan for stress test in am, patient and wife are aware and agreeable, consent signed, discussed stress test diet. VSS, will continue to monitor.     Problem: Patient Centered Care  Goal: Patient preferences are skin color and temperature  - Assess for signs of decreased coronary artery perfusion - ex.  Angina  - Evaluate fluid balance, assess for edema, trend weights  Outcome: Progressing  Goal: Absence of cardiac arrhythmias or at baseline  Description  INTERVENT

## 2020-01-24 NOTE — PLAN OF CARE
Problem: Patient Centered Care  Goal: Patient preferences are identified and integrated in the patient's plan of care  Description  Interventions:  - What would you like us to know as we care for you?  Nothing much  - Provide timely, complete, and accurat threatening arrhythmias  - Monitor electrolytes and administer replacement therapy as ordered  Outcome: Progressing     VSS. Pt pleasant and cooperative. Discussed having no caffeine tonight or in the morning prior to planned stress test later today.  No co

## 2020-01-24 NOTE — CM/SW NOTE
Patient failed inpatient criteria. Second level of review completed and supports observation. UR committee in agreement. Discussed with Dr. Zachariah Ely  who approves observation status. MOON given to the patient and order written.

## 2020-01-24 NOTE — ED PROVIDER NOTES
Patient Seen in: [facility] 3w/sw    History   Patient presents with:  Chest Pain Angina    Stated Complaint: Bronchitis    HPI    82year old male presenting with chest pain. Pain began 0430 .  The patient describes the pain as aching pressure, wi Other (Other[other]) Father    • Diabetes Mother    • Cancer Brother        Social History    Tobacco Use      Smoking status: Former Smoker        Packs/day: 1.25        Years: 25.00        Pack years: 31.25        Quit date: 6/15/1975        Years since normal sensory function, no focal deficits noted.   Psych: Calm, cooperative, nl affect    Differential diagnosis to include Acute coronary syndrome vs. Acute pulmonary process including pneumothorax vs. Dissection vs.  pleurisy vs. PE vs. Pneumonia/effusio PLATELET    Narrative: The following orders were created for panel order CBC WITH DIFFERENTIAL WITH PLATELET.   Procedure                               Abnormality         Status                     ---------                               ----------- pt)    2  Risk Factors- ( DM,HTN,Chol, fhx CAD, BMI>30, hx CAD)  ( >3 or hx CAD 2pt, 1-2 risks 1pt, None 0pt)  2  Troponin- ( 3 times nl 2pt, 2 times nl 1pt, nl 0pt   0         TOTAL  6    SCORE 0-3: 2.5% MACE over next 6 weeks consider D/C outpatient F/U (Principal) Chest pain with high risk for cardiac etiology R07.9 1/23/2020 Unknown    Bronchitis J40 3/29/7965     Metabolic alkalosis Z14.2 3/78/3046 Yes    Thrombocytopenia ([facility] [address] D69.6 1/23/2020 Yes

## 2020-01-24 NOTE — H&P
[facility]    History & Physical    Cassandra Hartley Patient Status:  Inpatient    10/27/1935 MRN [id number]   Location [facility] 3W/SW Attending Justice Swenson MD   Hosp Day # 1 PCP Luis Fernando Spaulding MD     Date:  2020 Medications as of January 24, 2020   Generic Name Brand Name Strength Route/ Site Freq. Albuterol Sulfate (Aero Soln) Albuterol Sulfate  (90 Base) MCG/ACT Inhalation Inhale 2 puffs into the lungs every 6 (six) hours as needed for Wheezing.       As Negative for gait disturbance; negative for paresthesias   All other review of systems are negative. Physical Exam:  PHYSICAL EXAM:   Blood pressure 117/74, pulse 92, temperature 97.8 °F (36.6 °C), temperature source Oral, resp.  rate 18, height 5' branches. 2. Diffuse bronchial wall thickening and mucous plugging is compatible with underlying bronchitis. 3. Borderline subcarinal lymphadenopathy, likely reactive in nature. 4. Scattered regional pleuroparenchymal scarring is noted.   5. Suspected he 25 mg po qD, and  metoprolol ER 50 mg po qD; did not tolerate amlodipine due to LE edema    Gout  Affected DIP of right index finger; s/p Medrol Dose loraine x 6d with improvement; check uric acid; no plans for urate lowering therapy unless recurrent gout

## 2020-01-24 NOTE — PROGRESS NOTES
[facility]    Progress Note    [place] Aubree Patient Status:  Inpatient    10/27/1935 MRN [id number]   Location [facility] 3W/SW Attending Leonides Collins MD   Hosp Day # 1 PCP Lori Quiroz MD       Assessment and Plan Results:     Recent Labs   Lab 01/23/20  0956 01/24/20  0603   RBC 4.49 4.35   HGB 14.9 14.5   HCT 43.6 41.5   MCV 97.1 95.4   MCH 33.2 33.3   MCHC 34.2 34.9   RDW 12.7 12.4   NEPRELIM 5.61 3.48   WBC 8.7 6.5   .0* 138.0*       Recent Labs   L -------------------------- Sinus Rhythm -Old anteroseptal infarct.  ABNORMAL When compared with ECG of 11/08/2012 05:16:51 No significant changes have occurred Electronically signed on 01/23/2020 at 13:17 by MD Luis Hudson, AP

## 2020-01-27 ENCOUNTER — PATIENT OUTREACH (OUTPATIENT)
Dept: CASE MANAGEMENT | Age: 85
End: 2020-01-27

## 2020-01-27 DIAGNOSIS — Z02.9 ENCOUNTERS FOR ADMINISTRATIVE PURPOSE: ICD-10-CM

## 2020-01-27 DIAGNOSIS — R07.9 CHEST PAIN WITH HIGH RISK FOR CARDIAC ETIOLOGY: ICD-10-CM

## 2020-01-27 PROCEDURE — 1111F DSCHRG MED/CURRENT MED MERGE: CPT

## 2020-01-27 NOTE — PROGRESS NOTES
Initial Post Discharge Follow Up   Discharge Date: 1/24/20  Contact Date: 1/27/2020    Consent Verification:  Assessment Completed With: Patient  HIPAA Verified?   Yes    Discharge Dx:   Chest pain with high risk for cardiac etiology    General:   • How medications with you to make sure we are not missing anything? yes  • Are there any reasons that keep you from taking your medication as prescribed? No  Are you having any concerns with constipation?  No    Referrals/orders at D/C:  Home Health/Services orde

## 2020-01-29 ENCOUNTER — APPOINTMENT (OUTPATIENT)
Dept: LAB | Age: 85
End: 2020-01-29
Attending: INTERNAL MEDICINE
Payer: MEDICARE

## 2020-01-29 DIAGNOSIS — N18.30 CKD (CHRONIC KIDNEY DISEASE), STAGE III (HCC): ICD-10-CM

## 2020-01-29 LAB
ANION GAP SERPL CALC-SCNC: 7 MMOL/L (ref 0–18)
BUN BLD-MCNC: 20 MG/DL (ref 7–18)
BUN/CREAT SERPL: 14.7 (ref 10–20)
CALCIUM BLD-MCNC: 9 MG/DL (ref 8.5–10.1)
CHLORIDE SERPL-SCNC: 106 MMOL/L (ref 98–112)
CO2 SERPL-SCNC: 28 MMOL/L (ref 21–32)
CREAT BLD-MCNC: 1.36 MG/DL (ref 0.7–1.3)
GLUCOSE BLD-MCNC: 105 MG/DL (ref 70–99)
OSMOLALITY SERPL CALC.SUM OF ELEC: 295 MOSM/KG (ref 275–295)
PATIENT FASTING Y/N/NP: NO
POTASSIUM SERPL-SCNC: 4.8 MMOL/L (ref 3.5–5.1)
SODIUM SERPL-SCNC: 141 MMOL/L (ref 136–145)

## 2020-01-29 PROCEDURE — 80048 BASIC METABOLIC PNL TOTAL CA: CPT

## 2020-01-29 PROCEDURE — 36415 COLL VENOUS BLD VENIPUNCTURE: CPT

## 2020-01-31 ENCOUNTER — OFFICE VISIT (OUTPATIENT)
Dept: INTERNAL MEDICINE CLINIC | Facility: CLINIC | Age: 85
End: 2020-01-31
Payer: MEDICARE

## 2020-01-31 VITALS
TEMPERATURE: 98 F | HEART RATE: 92 BPM | HEIGHT: 67 IN | SYSTOLIC BLOOD PRESSURE: 150 MMHG | DIASTOLIC BLOOD PRESSURE: 88 MMHG | BODY MASS INDEX: 39.08 KG/M2 | WEIGHT: 249 LBS

## 2020-01-31 DIAGNOSIS — M10.9 ACUTE GOUT OF RIGHT HAND, UNSPECIFIED CAUSE: ICD-10-CM

## 2020-01-31 DIAGNOSIS — I10 ESSENTIAL HYPERTENSION: ICD-10-CM

## 2020-01-31 DIAGNOSIS — R73.9 HYPERGLYCEMIA: ICD-10-CM

## 2020-01-31 DIAGNOSIS — N18.30 CKD (CHRONIC KIDNEY DISEASE), STAGE III (HCC): ICD-10-CM

## 2020-01-31 DIAGNOSIS — R07.9 CHEST PAIN, UNSPECIFIED TYPE: Primary | ICD-10-CM

## 2020-01-31 PROCEDURE — 99496 TRANSJ CARE MGMT HIGH F2F 7D: CPT | Performed by: INTERNAL MEDICINE

## 2020-01-31 RX ORDER — ALLOPURINOL 100 MG/1
100 TABLET ORAL DAILY
Qty: 30 TABLET | Refills: 5 | Status: SHIPPED | OUTPATIENT
Start: 2020-01-31 | End: 2021-03-05 | Stop reason: ALTCHOICE

## 2020-01-31 NOTE — PROGRESS NOTES
HPI:    Junior George is a 82year old male here today for hospital follow up.    He was discharged from Inpatient hospital, [facility]  to Home   Admission Date: 1/23/20   Discharge Date: 1/24/20  Hospital Discharge Diagnoses (since 1/1/2020) vessels. The patient was seen in consultation with cardiologist, Dr Georgina Costello. The patient was advised for admission to telemetry and further testing.   Pt had Lexiscan stress test which showed Normal Regadenoson ECG, and Normal regadenoson (Lexiscan) myocardia exertion  CARDIOVASCULAR: denies chest pain on exertion or palpitations  GI: denies abdominal pain, denies heartburn, denies diarrhea  MUSCULOSKELETAL: denies pain, normal range of motion of extremities  NEURO: denies headaches, denies dizziness, denies we mg total) by mouth daily. Imaging & Consults:  None    Chest pain  Abnormal EKG  Patient present with 8 hour h/o lower substernal chest pain.   EKG showed NSR, first degree AVB, and poor R wave progression, suggesting anteroseptal infarct, age Aleda Button cell carcinoma  Of nose, and right neck area; s/p s/p Mohs for [facility] done 8/28/17 with resection by dermatologist Dr Tyrone Higuera's contracture  Of both hands; monitor for now     PSA screening  PSA 1.2 in May 2017; would advise against PSA screening

## 2020-02-12 ENCOUNTER — TELEPHONE (OUTPATIENT)
Dept: INTERNAL MEDICINE CLINIC | Facility: CLINIC | Age: 85
End: 2020-02-12

## 2020-02-12 RX ORDER — METHYLPREDNISOLONE 4 MG/1
TABLET ORAL
Qty: 21 TABLET | Refills: 0 | Status: SHIPPED | OUTPATIENT
Start: 2020-02-12 | End: 2020-11-09

## 2020-02-12 NOTE — TELEPHONE ENCOUNTER
I spoke with patient. He was seen by Dr. Ernestina Otoole on 1/31/20 for hospital follow up. He was given steroids and allopurinol for gout in right hand index finger. He says he thinks allopurinol made his foot swell. He did not have this when he was last seen.  I

## 2020-02-12 NOTE — TELEPHONE ENCOUNTER
Pt is calling regarding medication, Allopurinol 100 MG, which is being taken for gout.  Pt states he stopped taking the medication three days ago on his own because his right foot started to swell more than normal. Since stopping the medication pt states th

## 2020-02-21 NOTE — TELEPHONE ENCOUNTER
Patient calling to inform Dr. Porsha Gamboa that his gout is better and he is doing well. No call back is necessary.

## 2020-03-11 ENCOUNTER — APPOINTMENT (OUTPATIENT)
Dept: LAB | Age: 85
End: 2020-03-11
Attending: INTERNAL MEDICINE
Payer: MEDICARE

## 2020-03-11 DIAGNOSIS — N18.30 CKD (CHRONIC KIDNEY DISEASE), STAGE III (HCC): ICD-10-CM

## 2020-03-11 DIAGNOSIS — M10.9 ACUTE GOUT OF RIGHT HAND, UNSPECIFIED CAUSE: ICD-10-CM

## 2020-03-11 LAB
ANION GAP SERPL CALC-SCNC: 7 MMOL/L (ref 0–18)
BUN BLD-MCNC: 16 MG/DL (ref 7–18)
BUN/CREAT SERPL: 11.3 (ref 10–20)
CALCIUM BLD-MCNC: 8.9 MG/DL (ref 8.5–10.1)
CHLORIDE SERPL-SCNC: 105 MMOL/L (ref 98–112)
CO2 SERPL-SCNC: 27 MMOL/L (ref 21–32)
CREAT BLD-MCNC: 1.41 MG/DL (ref 0.7–1.3)
GLUCOSE BLD-MCNC: 160 MG/DL (ref 70–99)
OSMOLALITY SERPL CALC.SUM OF ELEC: 293 MOSM/KG (ref 275–295)
POTASSIUM SERPL-SCNC: 4.2 MMOL/L (ref 3.5–5.1)
SODIUM SERPL-SCNC: 139 MMOL/L (ref 136–145)
URATE SERPL-MCNC: 9.2 MG/DL (ref 3.5–7.2)

## 2020-03-11 PROCEDURE — 36415 COLL VENOUS BLD VENIPUNCTURE: CPT

## 2020-03-11 PROCEDURE — 84550 ASSAY OF BLOOD/URIC ACID: CPT

## 2020-03-11 PROCEDURE — 80048 BASIC METABOLIC PNL TOTAL CA: CPT

## 2020-05-08 ENCOUNTER — TELEPHONE (OUTPATIENT)
Dept: INTERNAL MEDICINE CLINIC | Facility: CLINIC | Age: 85
End: 2020-05-08

## 2020-05-08 DIAGNOSIS — N30.00 ACUTE CYSTITIS WITHOUT HEMATURIA: Primary | ICD-10-CM

## 2020-05-08 NOTE — TELEPHONE ENCOUNTER
To Dr. Fiordaliza Coreas----    Pt reports sharp pain in his back x1 month that has been intermittent with movement. States the pain is in the middle R side of his back. Pt also reports urination issues x1 month.  States urine has been cloudy, pain at the end of urinat

## 2020-05-08 NOTE — TELEPHONE ENCOUNTER
Patient thinks he has a kidney problem  Has sharp pain in his back  No fever  Also has a urination problem that he wishes to speak directly to Dr Anil Clay about  Patient is afraid to come in to the office due to covid 19    Call back # [phone]

## 2020-05-09 ENCOUNTER — APPOINTMENT (OUTPATIENT)
Dept: LAB | Age: 85
End: 2020-05-09
Attending: INTERNAL MEDICINE
Payer: MEDICARE

## 2020-05-09 DIAGNOSIS — N30.00 ACUTE CYSTITIS WITHOUT HEMATURIA: ICD-10-CM

## 2020-05-09 PROCEDURE — 87186 SC STD MICRODIL/AGAR DIL: CPT

## 2020-05-09 PROCEDURE — 87077 CULTURE AEROBIC IDENTIFY: CPT

## 2020-05-09 PROCEDURE — 81001 URINALYSIS AUTO W/SCOPE: CPT

## 2020-05-09 PROCEDURE — 87086 URINE CULTURE/COLONY COUNT: CPT

## 2020-05-13 RX ORDER — CIPROFLOXACIN 250 MG/1
250 TABLET, FILM COATED ORAL 2 TIMES DAILY
Qty: 14 TABLET | Refills: 0 | Status: SHIPPED | OUTPATIENT
Start: 2020-05-13 | End: 2020-11-09

## 2020-05-18 RX ORDER — MECLIZINE HYDROCHLORIDE 25 MG/1
25 TABLET ORAL NIGHTLY
Qty: 90 TABLET | Refills: 3 | Status: SHIPPED | OUTPATIENT
Start: 2020-05-18 | End: 2021-02-02

## 2020-05-21 ENCOUNTER — APPOINTMENT (OUTPATIENT)
Dept: LAB | Age: 85
End: 2020-05-21
Attending: INTERNAL MEDICINE
Payer: MEDICARE

## 2020-05-21 DIAGNOSIS — N30.00 ACUTE CYSTITIS WITHOUT HEMATURIA: ICD-10-CM

## 2020-05-21 PROCEDURE — 81001 URINALYSIS AUTO W/SCOPE: CPT

## 2020-05-21 PROCEDURE — 87086 URINE CULTURE/COLONY COUNT: CPT

## 2020-05-26 ENCOUNTER — TELEPHONE (OUTPATIENT)
Dept: INTERNAL MEDICINE CLINIC | Facility: CLINIC | Age: 85
End: 2020-05-26

## 2020-05-26 NOTE — TELEPHONE ENCOUNTER
Imp- E Coli UTI on 5/9/20; s/p ciprofloxacin 250 mg po BID #14; GFR 40; repeat UA C&S on 5/21/20 shows infection has resolved, though still with small pyuria with WBC 45; pt called; all urinary sxs have resolved; will monitor; please notify pt

## 2020-06-01 ENCOUNTER — TELEPHONE (OUTPATIENT)
Dept: INTERNAL MEDICINE CLINIC | Facility: CLINIC | Age: 85
End: 2020-06-01

## 2020-06-01 RX ORDER — CIPROFLOXACIN 250 MG/1
250 TABLET, FILM COATED ORAL 2 TIMES DAILY
Qty: 28 TABLET | Refills: 0 | Status: SHIPPED | OUTPATIENT
Start: 2020-06-01 | End: 2021-03-05 | Stop reason: ALTCHOICE

## 2020-06-01 NOTE — TELEPHONE ENCOUNTER
Spoke with Lavonne Ferguson. States Dr Nakia Lazaro previously had him on an antibiotic for urinary problem which resolved and now it has come back. Symptoms came back on Saturday evening.   He states when he urinates \"it's not as fast.\" He has a burning sensation

## 2020-06-01 NOTE — TELEPHONE ENCOUNTER
Please call pt, having reoccurring problem, pt may need refill on medication  Pt did not care to elaborate with   Tasked to nursing

## 2020-06-01 NOTE — TELEPHONE ENCOUNTER
Imp- E Coli UTI on 5/9/20; s/p ciprofloxacin 250 mg po BID #14; GFR 40; repeat UA C&S on 5/21/20 shows infection has resolved, though still with small pyuria with WBC 45; now with recurrent +dysuria    Rec- resume Abx with ciprofloxacin 250 mg po BID #28;

## 2020-10-09 ENCOUNTER — TELEPHONE (OUTPATIENT)
Dept: INTERNAL MEDICINE CLINIC | Facility: CLINIC | Age: 85
End: 2020-10-09

## 2020-10-09 RX ORDER — METHYLPREDNISOLONE 4 MG/1
TABLET ORAL
Qty: 21 TABLET | Refills: 0 | Status: SHIPPED | OUTPATIENT
Start: 2020-10-09 | End: 2021-02-02

## 2020-10-09 NOTE — TELEPHONE ENCOUNTER
Patient is calling he is having a flare up for his gout in his right hand, symptoms started about 2 days ago  Patient is asking for something to be called into [facility]    Please call patient [phone]

## 2020-10-09 NOTE — TELEPHONE ENCOUNTER
To Dr. Lucina Cook----    Pt is calling to report a gout flare up on his R hand over the past 2 days. States his fingers are stiff and painful. Pt reports no diet changes.  He has not been taking his allopurinol 100mg that is on his medication list.     Pt is req

## 2020-11-02 RX ORDER — METOPROLOL SUCCINATE 50 MG/1
TABLET, EXTENDED RELEASE ORAL
Qty: 90 TABLET | Refills: 0 | Status: SHIPPED | OUTPATIENT
Start: 2020-11-02 | End: 2021-02-02

## 2021-01-30 DIAGNOSIS — I10 ESSENTIAL HYPERTENSION: ICD-10-CM

## 2021-01-30 DIAGNOSIS — Z00.00 PHYSICAL EXAM, ANNUAL: Primary | ICD-10-CM

## 2021-01-31 NOTE — TELEPHONE ENCOUNTER
Patient last saw Cata Lemus 1/31/2020. Needs appointment for annual PE.  To EMA  to please call patient and assist with scheduling then back to Rx group for refill

## 2021-02-02 RX ORDER — METOPROLOL SUCCINATE 50 MG/1
TABLET, EXTENDED RELEASE ORAL
Qty: 90 TABLET | Refills: 0 | Status: SHIPPED | OUTPATIENT
Start: 2021-02-02 | End: 2021-02-03

## 2021-02-02 RX ORDER — MECLIZINE HYDROCHLORIDE 25 MG/1
25 TABLET ORAL NIGHTLY
Qty: 90 TABLET | Refills: 0 | Status: SHIPPED | OUTPATIENT
Start: 2021-02-02 | End: 2021-02-03

## 2021-02-02 NOTE — TELEPHONE ENCOUNTER
Pt scheduled medicare annual with Dr Adolph Heath on March 5  Please call to advise on lab orders   Pt needs metoprolol & meclizine refilled

## 2021-02-03 RX ORDER — MECLIZINE HYDROCHLORIDE 25 MG/1
25 TABLET ORAL NIGHTLY
Qty: 90 TABLET | Refills: 3 | OUTPATIENT
Start: 2021-02-03

## 2021-02-03 RX ORDER — METOPROLOL SUCCINATE 50 MG/1
50 TABLET, EXTENDED RELEASE ORAL DAILY
Qty: 90 TABLET | Refills: 3 | Status: SHIPPED | OUTPATIENT
Start: 2021-02-03 | End: 2022-01-17

## 2021-02-03 RX ORDER — MECLIZINE HYDROCHLORIDE 25 MG/1
25 TABLET ORAL NIGHTLY
Qty: 90 TABLET | Refills: 3 | Status: SHIPPED | OUTPATIENT
Start: 2021-02-03 | End: 2021-02-05

## 2021-02-05 ENCOUNTER — TELEPHONE (OUTPATIENT)
Dept: INTERNAL MEDICINE CLINIC | Facility: CLINIC | Age: 86
End: 2021-02-05

## 2021-02-05 RX ORDER — TRAMADOL HYDROCHLORIDE 50 MG/1
50 TABLET ORAL EVERY 8 HOURS PRN
Qty: 30 TABLET | Refills: 5 | Status: SHIPPED | OUTPATIENT
Start: 2021-02-05 | End: 2021-02-08

## 2021-02-05 RX ORDER — MECLIZINE HYDROCHLORIDE 25 MG/1
25 TABLET ORAL 2 TIMES DAILY
Qty: 180 TABLET | Refills: 3 | Status: SHIPPED | OUTPATIENT
Start: 2021-02-05

## 2021-02-05 NOTE — TELEPHONE ENCOUNTER
ERx sent to meclizine 25 mg po BID #180 , 3 RF; pt called    Imp0 OA hands; due to CKD, avoid NSAIDs    Rec- trial tramadol 50 mg po every day prn, #30, 5 RF    ERx sent; pt caled

## 2021-02-05 NOTE — TELEPHONE ENCOUNTER
Pt requesting dosage change for   Meclizine (takes for Vertigo)   Pt does not need to double just needs some additional because he takes extra when dizzy so tends to run a bit short  Please call to discuss/advise  Tasked to nursing

## 2021-02-08 NOTE — TELEPHONE ENCOUNTER
Patient calling to update Dr. Santino Rivas per Dr. Santino Rivas request.  Tramadol made patient sick with nausea and chills. Patient slept the whole day. Stopped taking medication, but doing well now. Hand is feeling better.      Will see Dr. Santino Rivas next month

## 2021-02-13 DIAGNOSIS — Z23 NEED FOR VACCINATION: ICD-10-CM

## 2021-02-15 ENCOUNTER — IMMUNIZATION (OUTPATIENT)
Dept: LAB | Age: 86
End: 2021-02-15
Attending: HOSPITALIST
Payer: MEDICARE

## 2021-02-15 DIAGNOSIS — Z23 NEED FOR VACCINATION: Primary | ICD-10-CM

## 2021-02-15 PROCEDURE — 0001A SARSCOV2 VAC 30MCG/0.3ML IM: CPT

## 2021-03-01 ENCOUNTER — LAB ENCOUNTER (OUTPATIENT)
Dept: LAB | Age: 86
End: 2021-03-01
Attending: INTERNAL MEDICINE
Payer: MEDICARE

## 2021-03-01 DIAGNOSIS — I10 ESSENTIAL HYPERTENSION: ICD-10-CM

## 2021-03-01 LAB
ALBUMIN SERPL-MCNC: 3.9 G/DL (ref 3.4–5)
ALBUMIN/GLOB SERPL: 1.1 {RATIO} (ref 1–2)
ALP LIVER SERPL-CCNC: 77 U/L
ALT SERPL-CCNC: 22 U/L
ANION GAP SERPL CALC-SCNC: 3 MMOL/L (ref 0–18)
AST SERPL-CCNC: 22 U/L (ref 15–37)
BASOPHILS # BLD AUTO: 0.06 X10(3) UL (ref 0–0.2)
BASOPHILS NFR BLD AUTO: 0.9 %
BILIRUB SERPL-MCNC: 0.5 MG/DL (ref 0.1–2)
BUN BLD-MCNC: 16 MG/DL (ref 7–18)
BUN/CREAT SERPL: 10.5 (ref 10–20)
CALCIUM BLD-MCNC: 9.3 MG/DL (ref 8.5–10.1)
CHLORIDE SERPL-SCNC: 103 MMOL/L (ref 98–112)
CHOLEST SMN-MCNC: 178 MG/DL (ref ?–200)
CO2 SERPL-SCNC: 33 MMOL/L (ref 21–32)
CREAT BLD-MCNC: 1.52 MG/DL
DEPRECATED RDW RBC AUTO: 42.7 FL (ref 35.1–46.3)
EOSINOPHIL # BLD AUTO: 0.2 X10(3) UL (ref 0–0.7)
EOSINOPHIL NFR BLD AUTO: 2.9 %
ERYTHROCYTE [DISTWIDTH] IN BLOOD BY AUTOMATED COUNT: 12.4 % (ref 11–15)
GLOBULIN PLAS-MCNC: 3.7 G/DL (ref 2.8–4.4)
GLUCOSE BLD-MCNC: 118 MG/DL (ref 70–99)
HCT VFR BLD AUTO: 44.4 %
HDLC SERPL-MCNC: 36 MG/DL (ref 40–59)
HGB BLD-MCNC: 15.2 G/DL
IMM GRANULOCYTES # BLD AUTO: 0.02 X10(3) UL (ref 0–1)
IMM GRANULOCYTES NFR BLD: 0.3 %
LDLC SERPL CALC-MCNC: 99 MG/DL (ref ?–100)
LYMPHOCYTES # BLD AUTO: 1.8 X10(3) UL (ref 1–4)
LYMPHOCYTES NFR BLD AUTO: 26.2 %
M PROTEIN MFR SERPL ELPH: 7.6 G/DL (ref 6.4–8.2)
MCH RBC QN AUTO: 32.2 PG (ref 26–34)
MCHC RBC AUTO-ENTMCNC: 34.2 G/DL (ref 31–37)
MCV RBC AUTO: 94.1 FL
MONOCYTES # BLD AUTO: 0.59 X10(3) UL (ref 0.1–1)
MONOCYTES NFR BLD AUTO: 8.6 %
NEUTROPHILS # BLD AUTO: 4.21 X10 (3) UL (ref 1.5–7.7)
NEUTROPHILS # BLD AUTO: 4.21 X10(3) UL (ref 1.5–7.7)
NEUTROPHILS NFR BLD AUTO: 61.1 %
NONHDLC SERPL-MCNC: 142 MG/DL (ref ?–130)
OSMOLALITY SERPL CALC.SUM OF ELEC: 290 MOSM/KG (ref 275–295)
PATIENT FASTING Y/N/NP: YES
PATIENT FASTING Y/N/NP: YES
PLATELET # BLD AUTO: 164 10(3)UL (ref 150–450)
POTASSIUM SERPL-SCNC: 4.5 MMOL/L (ref 3.5–5.1)
RBC # BLD AUTO: 4.72 X10(6)UL
SODIUM SERPL-SCNC: 139 MMOL/L (ref 136–145)
TRIGL SERPL-MCNC: 213 MG/DL (ref 30–149)
TSI SER-ACNC: 2.49 MIU/ML (ref 0.36–3.74)
VLDLC SERPL CALC-MCNC: 43 MG/DL (ref 0–30)
WBC # BLD AUTO: 6.9 X10(3) UL (ref 4–11)

## 2021-03-01 PROCEDURE — 36415 COLL VENOUS BLD VENIPUNCTURE: CPT

## 2021-03-01 PROCEDURE — 80061 LIPID PANEL: CPT

## 2021-03-01 PROCEDURE — 84443 ASSAY THYROID STIM HORMONE: CPT

## 2021-03-01 PROCEDURE — 85025 COMPLETE CBC W/AUTO DIFF WBC: CPT

## 2021-03-01 PROCEDURE — 80053 COMPREHEN METABOLIC PANEL: CPT

## 2021-03-05 ENCOUNTER — OFFICE VISIT (OUTPATIENT)
Dept: INTERNAL MEDICINE CLINIC | Facility: CLINIC | Age: 86
End: 2021-03-05
Payer: MEDICARE

## 2021-03-05 VITALS
SYSTOLIC BLOOD PRESSURE: 146 MMHG | HEART RATE: 80 BPM | HEIGHT: 67 IN | OXYGEN SATURATION: 95 % | WEIGHT: 240.25 LBS | BODY MASS INDEX: 37.71 KG/M2 | DIASTOLIC BLOOD PRESSURE: 88 MMHG

## 2021-03-05 DIAGNOSIS — N18.30 STAGE 3 CHRONIC KIDNEY DISEASE, UNSPECIFIED WHETHER STAGE 3A OR 3B CKD (HCC): ICD-10-CM

## 2021-03-05 DIAGNOSIS — H81.10 BENIGN PAROXYSMAL POSITIONAL VERTIGO, UNSPECIFIED LATERALITY: ICD-10-CM

## 2021-03-05 DIAGNOSIS — M72.0 DUPUYTREN'S CONTRACTURE: ICD-10-CM

## 2021-03-05 DIAGNOSIS — I10 ESSENTIAL HYPERTENSION: ICD-10-CM

## 2021-03-05 DIAGNOSIS — R25.1 TREMOR OF BOTH HANDS: ICD-10-CM

## 2021-03-05 DIAGNOSIS — R73.9 HYPERGLYCEMIA: ICD-10-CM

## 2021-03-05 DIAGNOSIS — E04.1 LEFT THYROID NODULE: ICD-10-CM

## 2021-03-05 DIAGNOSIS — Z00.00 PHYSICAL EXAM, ANNUAL: Primary | ICD-10-CM

## 2021-03-05 DIAGNOSIS — C44.310 BASAL CELL CARCINOMA (BCC) OF SKIN OF FACE, UNSPECIFIED PART OF FACE: ICD-10-CM

## 2021-03-05 DIAGNOSIS — R60.0 BILATERAL LOWER EXTREMITY EDEMA: ICD-10-CM

## 2021-03-05 DIAGNOSIS — R94.31 ABNORMAL EKG: ICD-10-CM

## 2021-03-05 DIAGNOSIS — M10.9 ACUTE GOUT OF RIGHT HAND, UNSPECIFIED CAUSE: ICD-10-CM

## 2021-03-05 DIAGNOSIS — J45.909 REACTIVE AIRWAY DISEASE, UNSPECIFIED ASTHMA SEVERITY, UNCOMPLICATED: ICD-10-CM

## 2021-03-05 PROCEDURE — G0439 PPPS, SUBSEQ VISIT: HCPCS | Performed by: INTERNAL MEDICINE

## 2021-03-05 NOTE — PROGRESS NOTES
Nancyann Lombard is a 82year old male.     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            Allergies:    Pollen                      Comment:Seasonal allerg Directives     Do you have a healthcare power of ?: Yes    Do you have a living will?: Yes     Hearing Assessment (Required for AWV/SWV)    Whispered Voice    Visual Acuity                   Cognitive Assessment     What day of the week is this?: C placed or performed in visit on 05/05/15   • PNEUMOCOCCAL VACC, 13 TEMI IM    Update Immunization Activity if applicable    Hepatitis B No orders found for this or any previous visit.  Update Immunization Activity if applicable    Tetanus No orders found for hematochezia  Musculoskeletal:  Negative for arthralgias or myalgias  Genitourinary:  Negative for dysuria or polyuria  Hema/Lymph:  Negative for easy bleeding and easy bruising  Integumentary:  Negative for pruritus and rash  Neurological:  Negative for g vessels. The patient was seen in consultation with cardiologist, Dr Jamesetta Simmonds.    Pt had Lexiscan stress test which showed Normal Regadenoson ECG, and Normal regadenoson (Lexiscan) myocardial perfusion study with normal wall motion and left ventricular ejection placed in this encounter.       Meds This Visit:  Requested Prescriptions      No prescriptions requested or ordered in this encounter       Imaging & Referrals:  None     3/5/2021  Batsheva Cline MD

## 2021-03-08 ENCOUNTER — IMMUNIZATION (OUTPATIENT)
Dept: LAB | Age: 86
End: 2021-03-08
Attending: HOSPITALIST
Payer: MEDICARE

## 2021-03-08 DIAGNOSIS — Z23 NEED FOR VACCINATION: Primary | ICD-10-CM

## 2021-03-08 PROCEDURE — 0002A SARSCOV2 VAC 30MCG/0.3ML IM: CPT

## 2021-08-20 RX ORDER — ALBUTEROL SULFATE 90 UG/1
AEROSOL, METERED RESPIRATORY (INHALATION)
Qty: 1 EACH | Refills: 5 | Status: SHIPPED | OUTPATIENT
Start: 2021-08-20

## 2021-11-22 ENCOUNTER — TELEPHONE (OUTPATIENT)
Dept: INTERNAL MEDICINE CLINIC | Facility: CLINIC | Age: 86
End: 2021-11-22

## 2021-11-22 RX ORDER — METHYLPREDNISOLONE 4 MG/1
TABLET ORAL
Qty: 21 TABLET | Refills: 0 | Status: SHIPPED | OUTPATIENT
Start: 2021-11-22

## 2021-11-22 NOTE — TELEPHONE ENCOUNTER
Please advise - called patient who states he has gout flare up right foot, very stiff red and painful- to DR. RODRÍGUEZ

## 2021-11-22 NOTE — TELEPHONE ENCOUNTER
Pt is calling and states that he is having a flare  up of gout in his right foot. Pt is requesting a prescription to be sent into the [facility] in [place] on [address].     Any question call patient at [phone]

## 2022-01-17 RX ORDER — METOPROLOL SUCCINATE 50 MG/1
TABLET, EXTENDED RELEASE ORAL
Qty: 90 TABLET | Refills: 0 | Status: SHIPPED | OUTPATIENT
Start: 2022-01-17

## 2022-02-09 ENCOUNTER — HOSPITAL ENCOUNTER (OUTPATIENT)
Age: 87
Discharge: HOME | End: 2022-02-09
Payer: MEDICARE

## 2022-02-09 ENCOUNTER — TELEPHONE (OUTPATIENT)
Dept: INTERNAL MEDICINE CLINIC | Facility: CLINIC | Age: 87
End: 2022-02-09

## 2022-02-09 VITALS
OXYGEN SATURATION: 94 % | DIASTOLIC BLOOD PRESSURE: 82 MMHG | TEMPERATURE: 99 F | HEIGHT: 70 IN | RESPIRATION RATE: 20 BRPM | SYSTOLIC BLOOD PRESSURE: 182 MMHG | HEART RATE: 100 BPM | BODY MASS INDEX: 28.63 KG/M2 | WEIGHT: 200 LBS

## 2022-02-09 DIAGNOSIS — B34.9 VIRAL ILLNESS: Primary | ICD-10-CM

## 2022-02-09 PROCEDURE — 99213 OFFICE O/P EST LOW 20 MIN: CPT | Performed by: NURSE PRACTITIONER

## 2022-02-09 NOTE — TELEPHONE ENCOUNTER
Pt. Called stating he has a lot of head congestion. He said he is short of breath. He states every once in awhile he coughs up yellow mucous. He has been sick for at least 5 to 8 weeks. He took a home covid test last week which was negative. He is looking for direction. He can be reached at [phone].

## 2022-02-09 NOTE — TELEPHONE ENCOUNTER
COVID triage:     Start of symptoms: He has been sick for several weeks. He thinks he has been sick for 3 weeks but the kids are feeling like it has been ongoing for two months. Fever:  [x]  No fever  []  Temperature  []  Chills   []  Night sweats     Cough:  [x] Productive cough - yellow sputum   [] Cough with exertion  [] Dry cough     Breathing:  [x] Wheezing - using inhaler albuterol   [] Pain with deep breathing  [x] SOB with exertion - he has this from time to time, will stop at rest. His O2 is in 90s. His daughter says he \"is only at 90%\" and he will check pulse ox now. 93% now and heart rate is 63. Not on oxygen.   [] SOB at rest  [] Heavy breathing     GI Symptoms: no  [] Diarrhea  [] Nausea  [] Vomiting  [] Abdominal pain  [] Lack of appetite     Other symptoms:  [] Sore throat - losing his voice a bit  [] Difficulty swallowing  [] Nasal drainage  [x] Nasal congestion  [] PND  [x] Sinus pressure  [] Chest congestion  [] Ear pain  [] Body aches  [] Loss of sense of smell   [] Loss of sense of taste  []Conjunctivitis  [] Headache  [x] Fatigue  [] Weakness      []OTC Medications: albuterol and claritin      [] Any recent travel? No   [] Any sick contacts? No   [] Are you a healthcare worker? Vaccinated: Yes  [x]   No []      Vaccinated and boosted. No one sick at home. He did go for a Covid test a week ago and this was negative. This was a home test. Advised urgent care evaluation this morning at [address] [place] [address]. Patient is agreeable and he will have his daughter take him there now. Invited patient to call back with any questions or concerns. To Dr. Mallory Clements. Nursing follow up tomorrow.

## 2022-02-14 ENCOUNTER — TELEPHONE (OUTPATIENT)
Dept: INTERNAL MEDICINE CLINIC | Facility: CLINIC | Age: 87
End: 2022-02-14

## 2022-02-14 NOTE — TELEPHONE ENCOUNTER
Pt is calling and would like lab orders put in. Pt is schedule for his Medicare annual on 3/7/22 And would like to go in for the labs prior to that date.

## 2022-02-15 NOTE — TELEPHONE ENCOUNTER
Patient called and informed of fasting labs ordered by Dr Kameron Pa. Patient verbalized understanding with no further questions noted.

## 2022-03-02 ENCOUNTER — LAB ENCOUNTER (OUTPATIENT)
Dept: LAB | Age: 87
End: 2022-03-02
Attending: INTERNAL MEDICINE
Payer: MEDICARE

## 2022-03-02 DIAGNOSIS — I10 ESSENTIAL HYPERTENSION: ICD-10-CM

## 2022-03-02 LAB
ALBUMIN SERPL-MCNC: 3.7 G/DL (ref 3.4–5)
ALBUMIN/GLOB SERPL: 0.9 {RATIO} (ref 1–2)
ALP LIVER SERPL-CCNC: 82 U/L
ALT SERPL-CCNC: 36 U/L
ANION GAP SERPL CALC-SCNC: 6 MMOL/L (ref 0–18)
AST SERPL-CCNC: 43 U/L (ref 15–37)
BASOPHILS # BLD AUTO: 0.05 X10(3) UL (ref 0–0.2)
BASOPHILS NFR BLD AUTO: 0.8 %
BILIRUB SERPL-MCNC: 0.6 MG/DL (ref 0.1–2)
BUN BLD-MCNC: 14 MG/DL (ref 7–18)
BUN/CREAT SERPL: 9.5 (ref 10–20)
CALCIUM BLD-MCNC: 8.9 MG/DL (ref 8.5–10.1)
CHLORIDE SERPL-SCNC: 102 MMOL/L (ref 98–112)
CHOLEST SERPL-MCNC: 144 MG/DL (ref ?–200)
CO2 SERPL-SCNC: 29 MMOL/L (ref 21–32)
CREAT BLD-MCNC: 1.47 MG/DL
DEPRECATED RDW RBC AUTO: 43.7 FL (ref 35.1–46.3)
EOSINOPHIL # BLD AUTO: 0.16 X10(3) UL (ref 0–0.7)
EOSINOPHIL NFR BLD AUTO: 2.7 %
ERYTHROCYTE [DISTWIDTH] IN BLOOD BY AUTOMATED COUNT: 12.7 % (ref 11–15)
FASTING PATIENT LIPID ANSWER: YES
FASTING STATUS PATIENT QL REPORTED: YES
GLOBULIN PLAS-MCNC: 3.9 G/DL (ref 2.8–4.4)
GLUCOSE BLD-MCNC: 120 MG/DL (ref 70–99)
HDLC SERPL-MCNC: 36 MG/DL (ref 40–59)
HGB BLD-MCNC: 15.2 G/DL
IMM GRANULOCYTES # BLD AUTO: 0.02 X10(3) UL (ref 0–1)
IMM GRANULOCYTES NFR BLD: 0.3 %
LDLC SERPL CALC-MCNC: 87 MG/DL (ref ?–100)
LYMPHOCYTES # BLD AUTO: 1.26 X10(3) UL (ref 1–4)
LYMPHOCYTES NFR BLD AUTO: 21 %
MCH RBC QN AUTO: 30.8 PG (ref 26–34)
MCHC RBC AUTO-ENTMCNC: 32.6 G/DL (ref 31–37)
MCV RBC AUTO: 94.5 FL
MONOCYTES # BLD AUTO: 0.74 X10(3) UL (ref 0.1–1)
MONOCYTES NFR BLD AUTO: 12.4 %
NEUTROPHILS # BLD AUTO: 3.76 X10 (3) UL (ref 1.5–7.7)
NEUTROPHILS # BLD AUTO: 3.76 X10(3) UL (ref 1.5–7.7)
NEUTROPHILS NFR BLD AUTO: 62.8 %
NONHDLC SERPL-MCNC: 108 MG/DL (ref ?–130)
OSMOLALITY SERPL CALC.SUM OF ELEC: 286 MOSM/KG (ref 275–295)
PLATELET # BLD AUTO: 217 10(3)UL (ref 150–450)
POTASSIUM SERPL-SCNC: 4.4 MMOL/L (ref 3.5–5.1)
PROT SERPL-MCNC: 7.6 G/DL (ref 6.4–8.2)
RBC # BLD AUTO: 4.93 X10(6)UL
SODIUM SERPL-SCNC: 137 MMOL/L (ref 136–145)
TRIGL SERPL-MCNC: 118 MG/DL (ref 30–149)
TSI SER-ACNC: 1.72 MIU/ML (ref 0.36–3.74)
VLDLC SERPL CALC-MCNC: 19 MG/DL (ref 0–30)
WBC # BLD AUTO: 6 X10(3) UL (ref 4–11)

## 2022-03-02 PROCEDURE — 36415 COLL VENOUS BLD VENIPUNCTURE: CPT

## 2022-03-02 PROCEDURE — 84443 ASSAY THYROID STIM HORMONE: CPT

## 2022-03-02 PROCEDURE — 85025 COMPLETE CBC W/AUTO DIFF WBC: CPT

## 2022-03-02 PROCEDURE — 80053 COMPREHEN METABOLIC PANEL: CPT

## 2022-03-02 PROCEDURE — 80061 LIPID PANEL: CPT

## 2022-03-07 ENCOUNTER — OFFICE VISIT (OUTPATIENT)
Dept: INTERNAL MEDICINE CLINIC | Facility: CLINIC | Age: 87
End: 2022-03-07
Payer: MEDICARE

## 2022-03-07 VITALS
OXYGEN SATURATION: 98 % | SYSTOLIC BLOOD PRESSURE: 146 MMHG | HEIGHT: 70 IN | DIASTOLIC BLOOD PRESSURE: 70 MMHG | HEART RATE: 68 BPM | WEIGHT: 219.38 LBS | BODY MASS INDEX: 31.41 KG/M2 | TEMPERATURE: 99 F

## 2022-03-07 DIAGNOSIS — M17.0 PRIMARY OSTEOARTHRITIS OF BOTH KNEES: ICD-10-CM

## 2022-03-07 DIAGNOSIS — J20.9 ACUTE BRONCHITIS, UNSPECIFIED ORGANISM: ICD-10-CM

## 2022-03-07 DIAGNOSIS — N18.30 STAGE 3 CHRONIC KIDNEY DISEASE, UNSPECIFIED WHETHER STAGE 3A OR 3B CKD (HCC): ICD-10-CM

## 2022-03-07 DIAGNOSIS — R60.0 BILATERAL LOWER EXTREMITY EDEMA: ICD-10-CM

## 2022-03-07 DIAGNOSIS — M10.9 ACUTE GOUT OF RIGHT HAND, UNSPECIFIED CAUSE: ICD-10-CM

## 2022-03-07 DIAGNOSIS — R73.9 HYPERGLYCEMIA: ICD-10-CM

## 2022-03-07 DIAGNOSIS — M72.0 DUPUYTREN'S CONTRACTURE: ICD-10-CM

## 2022-03-07 DIAGNOSIS — J45.909 REACTIVE AIRWAY DISEASE, UNSPECIFIED ASTHMA SEVERITY, UNCOMPLICATED: ICD-10-CM

## 2022-03-07 DIAGNOSIS — H81.10 BENIGN PAROXYSMAL POSITIONAL VERTIGO, UNSPECIFIED LATERALITY: ICD-10-CM

## 2022-03-07 DIAGNOSIS — I48.91 ATRIAL FIBRILLATION, UNSPECIFIED TYPE (HCC): ICD-10-CM

## 2022-03-07 DIAGNOSIS — Z00.00 PHYSICAL EXAM, ANNUAL: Primary | ICD-10-CM

## 2022-03-07 DIAGNOSIS — I10 ESSENTIAL HYPERTENSION: ICD-10-CM

## 2022-03-07 DIAGNOSIS — R94.31 ABNORMAL EKG: ICD-10-CM

## 2022-03-07 DIAGNOSIS — R25.1 TREMOR OF BOTH HANDS: ICD-10-CM

## 2022-03-07 DIAGNOSIS — R06.00 DYSPNEA, UNSPECIFIED TYPE: ICD-10-CM

## 2022-03-07 DIAGNOSIS — C44.310 BASAL CELL CARCINOMA (BCC) OF SKIN OF FACE, UNSPECIFIED PART OF FACE: ICD-10-CM

## 2022-03-07 PROCEDURE — 99214 OFFICE O/P EST MOD 30 MIN: CPT | Performed by: INTERNAL MEDICINE

## 2022-03-07 PROCEDURE — 93000 ELECTROCARDIOGRAM COMPLETE: CPT | Performed by: INTERNAL MEDICINE

## 2022-03-07 PROCEDURE — G0439 PPPS, SUBSEQ VISIT: HCPCS | Performed by: INTERNAL MEDICINE

## 2022-03-07 PROCEDURE — 96127 BRIEF EMOTIONAL/BEHAV ASSMT: CPT | Performed by: INTERNAL MEDICINE

## 2022-03-07 RX ORDER — NAPROXEN SODIUM 220 MG
TABLET ORAL 2 TIMES DAILY
COMMUNITY
End: 2022-04-04

## 2022-03-07 RX ORDER — DILTIAZEM HYDROCHLORIDE 180 MG/1
180 CAPSULE, EXTENDED RELEASE ORAL DAILY
Qty: 30 CAPSULE | Refills: 5 | Status: SHIPPED | OUTPATIENT
Start: 2022-03-07

## 2022-03-07 RX ORDER — CEFUROXIME AXETIL 500 MG/1
500 TABLET ORAL 2 TIMES DAILY
Qty: 20 TABLET | Refills: 0 | Status: ON HOLD | OUTPATIENT
Start: 2022-03-07 | End: 2022-03-24

## 2022-03-11 ENCOUNTER — HOSPITAL ENCOUNTER (OUTPATIENT)
Dept: CV DIAGNOSTICS | Facility: HOSPITAL | Age: 87
Discharge: HOME | End: 2022-03-11
Attending: INTERNAL MEDICINE
Payer: MEDICARE

## 2022-03-11 ENCOUNTER — HOSPITAL ENCOUNTER (OUTPATIENT)
Dept: GENERAL RADIOLOGY | Facility: HOSPITAL | Age: 87
Discharge: HOME | End: 2022-03-11
Attending: INTERNAL MEDICINE
Payer: MEDICARE

## 2022-03-11 DIAGNOSIS — I48.91 ATRIAL FIBRILLATION, UNSPECIFIED TYPE (HCC): ICD-10-CM

## 2022-03-11 DIAGNOSIS — R06.00 DYSPNEA, UNSPECIFIED TYPE: ICD-10-CM

## 2022-03-11 PROCEDURE — 71046 X-RAY EXAM CHEST 2 VIEWS: CPT | Performed by: INTERNAL MEDICINE

## 2022-03-11 PROCEDURE — 93306 TTE W/DOPPLER COMPLETE: CPT | Performed by: INTERNAL MEDICINE

## 2022-03-16 ENCOUNTER — TELEPHONE (OUTPATIENT)
Dept: INTERNAL MEDICINE CLINIC | Facility: CLINIC | Age: 87
End: 2022-03-16

## 2022-03-16 RX ORDER — LEVOFLOXACIN 750 MG/1
750 TABLET ORAL EVERY OTHER DAY
Qty: 5 TABLET | Refills: 0 | Status: ON HOLD | OUTPATIENT
Start: 2022-03-16 | End: 2022-03-24

## 2022-03-16 NOTE — TELEPHONE ENCOUNTER
Patient is calling he has been on an Cefuroxime since 3/7/22, patient has 1 pill left  He is still experiencing productive cough producing mucus    He is feeling a little better but would like to know if he should continue the antibiotic, if so please call into [facility]    Phone [phone]

## 2022-03-16 NOTE — TELEPHONE ENCOUNTER
Imp- RUL pneumonia     Seen on CXR 3/11/22    S/p Ceftin 500 mg po BID; still with +cough and Yellow sputum    Rec- Levaquin 750 mg po qOD #5    Repeat CXR in 9 d; RTC 3/25/22; pt called

## 2022-03-17 RX ORDER — AZITHROMYCIN 250 MG/1
TABLET, FILM COATED ORAL
Qty: 6 TABLET | Refills: 0 | Status: SHIPPED | OUTPATIENT
Start: 2022-03-17 | End: 2022-03-22

## 2022-03-17 RX ORDER — AMOXICILLIN AND CLAVULANATE POTASSIUM 875; 125 MG/1; MG/1
1 TABLET, FILM COATED ORAL 2 TIMES DAILY
Qty: 20 TABLET | Refills: 0 | Status: ON HOLD | OUTPATIENT
Start: 2022-03-17 | End: 2022-03-24

## 2022-03-17 NOTE — TELEPHONE ENCOUNTER
Imp=- RUL pneumonia    Rec- stop levofloxacin    Augmentin 875 mg po BID #20    Zithromax (Z-loraine) as directed x5d    ERx sent; pt called

## 2022-03-20 RX ORDER — MECLIZINE HYDROCHLORIDE 25 MG/1
TABLET ORAL
Qty: 180 TABLET | Refills: 3 | Status: SHIPPED | OUTPATIENT
Start: 2022-03-20

## 2022-03-24 ENCOUNTER — APPOINTMENT (OUTPATIENT)
Dept: GENERAL RADIOLOGY | Facility: HOSPITAL | Age: 87
End: 2022-03-24
Attending: EMERGENCY MEDICINE
Payer: MEDICARE

## 2022-03-24 ENCOUNTER — HOSPITAL ENCOUNTER (INPATIENT)
Facility: HOSPITAL | Age: 87
LOS: 11 days | Discharge: HOME HEALTH | End: 2022-04-04
Attending: EMERGENCY MEDICINE | Admitting: EMERGENCY MEDICINE
Payer: MEDICARE

## 2022-03-24 ENCOUNTER — APPOINTMENT (OUTPATIENT)
Dept: CT IMAGING | Facility: HOSPITAL | Age: 87
End: 2022-03-24
Attending: INTERNAL MEDICINE
Payer: MEDICARE

## 2022-03-24 ENCOUNTER — APPOINTMENT (OUTPATIENT)
Dept: CT IMAGING | Facility: HOSPITAL | Age: 87
End: 2022-03-24
Attending: EMERGENCY MEDICINE
Payer: MEDICARE

## 2022-03-24 ENCOUNTER — TELEPHONE (OUTPATIENT)
Dept: INTERNAL MEDICINE CLINIC | Facility: CLINIC | Age: 87
End: 2022-03-24

## 2022-03-24 DIAGNOSIS — C34.91 PRIMARY MALIGNANT NEOPLASM OF RIGHT LUNG METASTATIC TO OTHER SITE (HCC): ICD-10-CM

## 2022-03-24 DIAGNOSIS — R09.02 HYPOXIA: Primary | ICD-10-CM

## 2022-03-24 DIAGNOSIS — C7A.8 NEUROENDOCRINE CARCINOMA OF LUNG (HCC): ICD-10-CM

## 2022-03-24 DIAGNOSIS — R06.00 DYSPNEA, UNSPECIFIED TYPE: ICD-10-CM

## 2022-03-24 LAB
ALBUMIN SERPL-MCNC: 3.1 G/DL (ref 3.4–5)
ALP LIVER SERPL-CCNC: 87 U/L
ALT SERPL-CCNC: 63 U/L
ANION GAP SERPL CALC-SCNC: 9 MMOL/L (ref 0–18)
AST SERPL-CCNC: 63 U/L (ref 15–37)
BASOPHILS # BLD AUTO: 0.03 X10(3) UL (ref 0–0.2)
BASOPHILS NFR BLD AUTO: 0.5 %
BILIRUB DIRECT SERPL-MCNC: 0.2 MG/DL (ref 0–0.2)
BILIRUB SERPL-MCNC: 0.6 MG/DL (ref 0.1–2)
BUN BLD-MCNC: 10 MG/DL (ref 7–18)
BUN/CREAT SERPL: 9.3 (ref 10–20)
CALCIUM BLD-MCNC: 8.7 MG/DL (ref 8.5–10.1)
CHLORIDE SERPL-SCNC: 101 MMOL/L (ref 98–112)
CO2 SERPL-SCNC: 25 MMOL/L (ref 21–32)
CREAT BLD-MCNC: 1.07 MG/DL
DEPRECATED RDW RBC AUTO: 41.3 FL (ref 35.1–46.3)
EOSINOPHIL # BLD AUTO: 0.07 X10(3) UL (ref 0–0.7)
EOSINOPHIL NFR BLD AUTO: 1.3 %
ERYTHROCYTE [DISTWIDTH] IN BLOOD BY AUTOMATED COUNT: 12.7 % (ref 11–15)
EST. AVERAGE GLUCOSE BLD GHB EST-MCNC: 117 MG/DL (ref 68–126)
GLUCOSE BLD-MCNC: 110 MG/DL (ref 70–99)
HBA1C MFR BLD: 5.7 % (ref ?–5.7)
HCT VFR BLD AUTO: 40 %
HGB BLD-MCNC: 13.6 G/DL
IMM GRANULOCYTES # BLD AUTO: 0.03 X10(3) UL (ref 0–1)
IMM GRANULOCYTES NFR BLD: 0.5 %
INR BLD: 1.22 (ref 0.8–1.2)
LYMPHOCYTES # BLD AUTO: 0.57 X10(3) UL (ref 1–4)
LYMPHOCYTES NFR BLD AUTO: 10.3 %
MCH RBC QN AUTO: 30.8 PG (ref 26–34)
MCHC RBC AUTO-ENTMCNC: 34 G/DL (ref 31–37)
MCV RBC AUTO: 90.7 FL
MONOCYTES # BLD AUTO: 0.54 X10(3) UL (ref 0.1–1)
MONOCYTES NFR BLD AUTO: 9.7 %
NEUTROPHILS # BLD AUTO: 4.31 X10 (3) UL (ref 1.5–7.7)
NEUTROPHILS # BLD AUTO: 4.31 X10(3) UL (ref 1.5–7.7)
NEUTROPHILS NFR BLD AUTO: 77.7 %
NT-PROBNP SERPL-MCNC: 608 PG/ML (ref ?–450)
OSMOLALITY SERPL CALC.SUM OF ELEC: 280 MOSM/KG (ref 275–295)
PLATELET # BLD AUTO: 177 10(3)UL (ref 150–450)
POTASSIUM SERPL-SCNC: 3.6 MMOL/L (ref 3.5–5.1)
PROCALCITONIN SERPL-MCNC: 0.84 NG/ML (ref ?–0.16)
PROT SERPL-MCNC: 7.3 G/DL (ref 6.4–8.2)
PROTHROMBIN TIME: 15.5 SECONDS (ref 11.6–14.8)
RBC # BLD AUTO: 4.41 X10(6)UL
SARS-COV-2 RNA RESP QL NAA+PROBE: NOT DETECTED
SODIUM SERPL-SCNC: 135 MMOL/L (ref 136–145)
TROPONIN I HIGH SENSITIVITY: 11 NG/L
WBC # BLD AUTO: 5.6 X10(3) UL (ref 4–11)

## 2022-03-24 PROCEDURE — 83880 ASSAY OF NATRIURETIC PEPTIDE: CPT | Performed by: EMERGENCY MEDICINE

## 2022-03-24 PROCEDURE — 74177 CT ABD & PELVIS W/CONTRAST: CPT | Performed by: INTERNAL MEDICINE

## 2022-03-24 PROCEDURE — 85025 COMPLETE CBC W/AUTO DIFF WBC: CPT | Performed by: EMERGENCY MEDICINE

## 2022-03-24 PROCEDURE — 85610 PROTHROMBIN TIME: CPT | Performed by: EMERGENCY MEDICINE

## 2022-03-24 PROCEDURE — 93005 ELECTROCARDIOGRAM TRACING: CPT

## 2022-03-24 PROCEDURE — 83036 HEMOGLOBIN GLYCOSYLATED A1C: CPT | Performed by: INTERNAL MEDICINE

## 2022-03-24 PROCEDURE — 87493 C DIFF AMPLIFIED PROBE: CPT | Performed by: INTERNAL MEDICINE

## 2022-03-24 PROCEDURE — 93010 ELECTROCARDIOGRAM REPORT: CPT | Performed by: EMERGENCY MEDICINE

## 2022-03-24 PROCEDURE — 5A0955A ASSISTANCE WITH RESPIRATORY VENTILATION, GREATER THAN 96 CONSECUTIVE HOURS, HIGH NASAL FLOW/VELOCITY: ICD-10-PCS | Performed by: INTERNAL MEDICINE

## 2022-03-24 PROCEDURE — 80076 HEPATIC FUNCTION PANEL: CPT | Performed by: EMERGENCY MEDICINE

## 2022-03-24 PROCEDURE — 84145 PROCALCITONIN (PCT): CPT | Performed by: INTERNAL MEDICINE

## 2022-03-24 PROCEDURE — 80048 BASIC METABOLIC PNL TOTAL CA: CPT | Performed by: EMERGENCY MEDICINE

## 2022-03-24 PROCEDURE — 71250 CT THORAX DX C-: CPT | Performed by: EMERGENCY MEDICINE

## 2022-03-24 PROCEDURE — 84484 ASSAY OF TROPONIN QUANT: CPT | Performed by: EMERGENCY MEDICINE

## 2022-03-24 PROCEDURE — 96374 THER/PROPH/DIAG INJ IV PUSH: CPT

## 2022-03-24 PROCEDURE — 99285 EMERGENCY DEPT VISIT HI MDM: CPT

## 2022-03-24 PROCEDURE — 71045 X-RAY EXAM CHEST 1 VIEW: CPT | Performed by: EMERGENCY MEDICINE

## 2022-03-24 RX ORDER — METOPROLOL SUCCINATE 50 MG/1
50 TABLET, EXTENDED RELEASE ORAL DAILY
Status: DISCONTINUED | OUTPATIENT
Start: 2022-03-25 | End: 2022-03-29

## 2022-03-24 RX ORDER — FUROSEMIDE 10 MG/ML
20 INJECTION INTRAMUSCULAR; INTRAVENOUS ONCE
Status: COMPLETED | OUTPATIENT
Start: 2022-03-24 | End: 2022-03-24

## 2022-03-24 RX ORDER — VANCOMYCIN HYDROCHLORIDE 125 MG/1
125 CAPSULE ORAL DAILY
Status: DISCONTINUED | OUTPATIENT
Start: 2022-03-24 | End: 2022-04-04

## 2022-03-24 RX ORDER — MECLIZINE HYDROCHLORIDE 25 MG/1
25 TABLET ORAL 2 TIMES DAILY
Status: DISCONTINUED | OUTPATIENT
Start: 2022-03-24 | End: 2022-04-04

## 2022-03-24 RX ORDER — SODIUM CHLORIDE 9 MG/ML
INJECTION, SOLUTION INTRAVENOUS CONTINUOUS
Status: DISCONTINUED | OUTPATIENT
Start: 2022-03-24 | End: 2022-03-26

## 2022-03-24 RX ORDER — DILTIAZEM HYDROCHLORIDE 180 MG/1
180 CAPSULE, EXTENDED RELEASE ORAL DAILY
Status: DISCONTINUED | OUTPATIENT
Start: 2022-03-25 | End: 2022-04-04

## 2022-03-24 RX ORDER — POTASSIUM CHLORIDE 20 MEQ/1
40 TABLET, EXTENDED RELEASE ORAL EVERY 4 HOURS
Status: COMPLETED | OUTPATIENT
Start: 2022-03-24 | End: 2022-03-24

## 2022-03-24 RX ORDER — ALBUTEROL SULFATE 2.5 MG/3ML
2.5 SOLUTION RESPIRATORY (INHALATION)
Status: DISCONTINUED | OUTPATIENT
Start: 2022-03-24 | End: 2022-03-30

## 2022-03-24 RX ORDER — HYDRALAZINE HYDROCHLORIDE 20 MG/ML
5 INJECTION INTRAMUSCULAR; INTRAVENOUS EVERY 6 HOURS PRN
Status: DISCONTINUED | OUTPATIENT
Start: 2022-03-24 | End: 2022-04-04

## 2022-03-24 NOTE — PLAN OF CARE
Bed low and locked, side rails up x2, call light within reach and pt educated to call when needing assistance. Pt short of breath upon arrival to the floor. Put on 2L NC w/ relief. Admission completed and wife at bedside w/ plans to stay the night. Plan for possible bronchoscopy. Ct abd completed. Pt w/ IVF infusing and electrolyte protocol ordered. Will continue to monitor. Problem: Patient Centered Care  Goal: Patient preferences are identified and integrated in the patient's plan of care  Description: Interventions:  - What would you like us to know as we care for you?  I live at home with my wife  - Provide timely, complete, and accurate information to patient/family  - Incorporate patient and family knowledge, values, beliefs, and cultural backgrounds into the planning and delivery of care  - Encourage patient/family to participate in care and decision-making at the level they choose  - Honor patient and family perspectives and choices  Outcome: Progressing     Problem: Patient/Family Goals  Goal: Patient/Family Long Term Goal  Description: Patient's Long Term Goal: walk without being out of breath    Interventions:  - See additional Care Plan goals for specific interventions  Outcome: Progressing  Goal: Patient/Family Short Term Goal  Description: Patient's Short Term Goal: catch my breath    Interventions:   - See additional Care Plan goals for specific interventions  Outcome: Progressing     Problem: RESPIRATORY - ADULT  Goal: Achieves optimal ventilation and oxygenation  Description: INTERVENTIONS:  - Assess for changes in respiratory status  - Assess for changes in mentation and behavior  - Position to facilitate oxygenation and minimize respiratory effort  - Oxygen supplementation based on oxygen saturation or ABGs  - Provide Smoking Cessation handout, if applicable  - Encourage broncho-pulmonary hygiene including cough, deep breathe, Incentive Spirometry  - Assess the need for suctioning and perform as needed  - Assess and instruct to report SOB or any respiratory difficulty  - Respiratory Therapy support as indicated  - Manage/alleviate anxiety  - Monitor for signs/symptoms of CO2 retention  Outcome: Progressing

## 2022-03-24 NOTE — CM/SW NOTE
03/24/22 1200   CM/SW Screening   Referral [facility] staff; Chart review;Nursing rounds   Patient's Current Mental Status at Time of Assessment Alert;Oriented   Patient lives with Spouse/Significant other   Patient Status Prior to Admission   Independent with ADLs and Mobility Yes   MDO for Adv Dir    Per chart review work up in progress for lung mass and possible mets to liver. Pulm consulted and poss diagnostic bronch needed. Pt currently requires 02 2L. CM/SW will follow, pending work up pt may benefit from [facility] consult to determine [address] and complete Adv Dir. Plan  tbd    / to remain available for support and/or discharge planning.      Shorty Kang RN    Ext [phone]

## 2022-03-24 NOTE — TELEPHONE ENCOUNTER
Pt wife, Joyce Hart called and left voicemail   Wanted Dr Teresa Bales to be aware patient at [address] [place] [address]  Please call Joyce Hart with any questions at [phone]  Tasked to nursing

## 2022-03-24 NOTE — ED QUICK NOTES
Orders for admission, patient is aware of plan and ready to go upstairs. Any questions, please call ED RN Halie Standing at extension [phone].      Patient Covid vaccination status: Fully vaccinated and immunocompromised     COVID Test Ordered in ED: Rapid SARS-CoV-2 by PCR    COVID Suspicion at Admission: Low clinical suspicion for COVID    Running Infusions:  None    Mental Status/LOC at time of transport: a/ox3    Other pertinent information:   CIWA score: N/A   NIH score:  N/A

## 2022-03-24 NOTE — ED INITIAL ASSESSMENT (HPI)
Pt c/o SOB. Pt diagnosed with pneumonia earlier this month and reports it getting worse within last few days.

## 2022-03-25 PROBLEM — J18.9 PNEUMONIA OF RIGHT UPPER LOBE DUE TO INFECTIOUS ORGANISM: Status: ACTIVE | Noted: 2022-03-25

## 2022-03-25 PROBLEM — R19.7 DIARRHEA OF PRESUMED INFECTIOUS ORIGIN: Status: ACTIVE | Noted: 2022-03-25

## 2022-03-25 LAB
ALBUMIN SERPL-MCNC: 3.2 G/DL (ref 3.4–5)
ALBUMIN/GLOB SERPL: 0.7 {RATIO} (ref 1–2)
ALP LIVER SERPL-CCNC: 92 U/L
ALT SERPL-CCNC: 77 U/L
ANION GAP SERPL CALC-SCNC: 9 MMOL/L (ref 0–18)
AST SERPL-CCNC: 71 U/L (ref 15–37)
BILIRUB SERPL-MCNC: 0.8 MG/DL (ref 0.1–2)
BUN BLD-MCNC: 9 MG/DL (ref 7–18)
BUN/CREAT SERPL: 8.3 (ref 10–20)
C DIFF TOX B STL QL: NEGATIVE
CALCIUM BLD-MCNC: 8.5 MG/DL (ref 8.5–10.1)
CHLORIDE SERPL-SCNC: 98 MMOL/L (ref 98–112)
CO2 SERPL-SCNC: 26 MMOL/L (ref 21–32)
CREAT BLD-MCNC: 1.09 MG/DL
GLOBULIN PLAS-MCNC: 4.3 G/DL (ref 2.8–4.4)
GLUCOSE BLD-MCNC: 114 MG/DL (ref 70–99)
OSMOLALITY SERPL CALC.SUM OF ELEC: 276 MOSM/KG (ref 275–295)
POTASSIUM SERPL-SCNC: 4.3 MMOL/L (ref 3.5–5.1)
POTASSIUM SERPL-SCNC: 4.3 MMOL/L (ref 3.5–5.1)
PROT SERPL-MCNC: 7.5 G/DL (ref 6.4–8.2)
SODIUM SERPL-SCNC: 133 MMOL/L (ref 136–145)

## 2022-03-25 PROCEDURE — 94640 AIRWAY INHALATION TREATMENT: CPT

## 2022-03-25 PROCEDURE — 80053 COMPREHEN METABOLIC PANEL: CPT | Performed by: INTERNAL MEDICINE

## 2022-03-25 PROCEDURE — 87205 SMEAR GRAM STAIN: CPT | Performed by: INTERNAL MEDICINE

## 2022-03-25 PROCEDURE — 84132 ASSAY OF SERUM POTASSIUM: CPT | Performed by: INTERNAL MEDICINE

## 2022-03-25 PROCEDURE — 87070 CULTURE OTHR SPECIMN AEROBIC: CPT | Performed by: INTERNAL MEDICINE

## 2022-03-25 RX ORDER — HEPARIN SODIUM 5000 [USP'U]/ML
5000 INJECTION, SOLUTION INTRAVENOUS; SUBCUTANEOUS EVERY 12 HOURS
Status: DISCONTINUED | OUTPATIENT
Start: 2022-03-26 | End: 2022-03-27

## 2022-03-25 NOTE — PLAN OF CARE
Problem: Patient Centered Care  Goal: Patient preferences are identified and integrated in the patient's plan of care  Description: Interventions:  - What would you like us to know as we care for you? I live at home with my wife  - Provide timely, complete, and accurate information to patient/family  - Incorporate patient and family knowledge, values, beliefs, and cultural backgrounds into the planning and delivery of care  - Encourage patient/family to participate in care and decision-making at the level they choose  - Honor patient and family perspectives and choices  Outcome: Progressing     Problem: Patient/Family Goals  Goal: Patient/Family Long Term Goal  Description: Patient's Long Term Goal: walk without being out of breath    Interventions:  - Pulmonology on consult  -IV antibiotics  -IVF    - See additional Care Plan goals for specific interventions  Outcome: Progressing  Goal: Patient/Family Short Term Goal  Description: Patient's Short Term Goal: catch my breath    Interventions:     - O2 supplement as needed  - IV antibiotics  - Nebulizer treatment    - See additional Care Plan goals for specific interventions  Outcome: Progressing     Problem: RESPIRATORY - ADULT  Goal: Achieves optimal ventilation and oxygenation  Description: INTERVENTIONS:  - Assess for changes in respiratory status  - Assess for changes in mentation and behavior  - Position to facilitate oxygenation and minimize respiratory effort  - Oxygen supplementation based on oxygen saturation or ABGs  - Provide Smoking Cessation handout, if applicable  - Encourage broncho-pulmonary hygiene including cough, deep breathe, Incentive Spirometry  - Assess the need for suctioning and perform as needed  - Assess and instruct to report SOB or any respiratory difficulty  - Respiratory Therapy support as indicated  - Manage/alleviate anxiety  - Monitor for signs/symptoms of CO2 retention  Outcome: Progressing     Pt alert and oriented X 4.  No complaints throughout the day. Waiting for collection of sputum sample. Pt has IV fluids running. Pt receiving IV abx. Pt on 7 L high flow nasal cannula. Safety precautions in place. Call light within reach.

## 2022-03-25 NOTE — PLAN OF CARE
Problem: RESPIRATORY - ADULT  Goal: Achieves optimal ventilation and oxygenation  Description: INTERVENTIONS:  - Assess for changes in respiratory status  - Assess for changes in mentation and behavior  - Position to facilitate oxygenation and minimize respiratory effort  - Oxygen supplementation based on oxygen saturation or ABGs  - Provide Smoking Cessation handout, if applicable  - Encourage broncho-pulmonary hygiene including cough, deep breathe, Incentive Spirometry  - Assess the need for suctioning and perform as needed  - Assess and instruct to report SOB or any respiratory difficulty  - Respiratory Therapy support as indicated  - Manage/alleviate anxiety  - Monitor for signs/symptoms of CO2 retention  Outcome: Not Progressing    Received awake,oriented. Remains short of breath at rest and with exertion. On nebulizer treatment,IV antibiotics/IVF. Eliquis on hold for possible bronchoscopy. Wife stayed overnight. Call light within reach. Instructed to call for assistance. Will continue to monitor pt.

## 2022-03-26 LAB
ALBUMIN SERPL-MCNC: 3 G/DL (ref 3.4–5)
ALBUMIN/GLOB SERPL: 0.7 {RATIO} (ref 1–2)
ALP LIVER SERPL-CCNC: 86 U/L
ALT SERPL-CCNC: 84 U/L
ANION GAP SERPL CALC-SCNC: 6 MMOL/L (ref 0–18)
AST SERPL-CCNC: 75 U/L (ref 15–37)
BASOPHILS # BLD AUTO: 0.02 X10(3) UL (ref 0–0.2)
BASOPHILS NFR BLD AUTO: 0.3 %
BILIRUB SERPL-MCNC: 0.8 MG/DL (ref 0.1–2)
BUN BLD-MCNC: 10 MG/DL (ref 7–18)
BUN/CREAT SERPL: 9.2 (ref 10–20)
CALCIUM BLD-MCNC: 9 MG/DL (ref 8.5–10.1)
CHLORIDE SERPL-SCNC: 95 MMOL/L (ref 98–112)
CO2 SERPL-SCNC: 31 MMOL/L (ref 21–32)
CREAT BLD-MCNC: 1.09 MG/DL
DEPRECATED RDW RBC AUTO: 42.8 FL (ref 35.1–46.3)
EOSINOPHIL # BLD AUTO: 0.08 X10(3) UL (ref 0–0.7)
EOSINOPHIL NFR BLD AUTO: 1.2 %
ERYTHROCYTE [DISTWIDTH] IN BLOOD BY AUTOMATED COUNT: 12.9 % (ref 11–15)
GLOBULIN PLAS-MCNC: 4.4 G/DL (ref 2.8–4.4)
GLUCOSE BLD-MCNC: 110 MG/DL (ref 70–99)
HCT VFR BLD AUTO: 41.4 %
HGB BLD-MCNC: 13.8 G/DL
IMM GRANULOCYTES # BLD AUTO: 0.03 X10(3) UL (ref 0–1)
IMM GRANULOCYTES NFR BLD: 0.4 %
LYMPHOCYTES # BLD AUTO: 0.8 X10(3) UL (ref 1–4)
LYMPHOCYTES NFR BLD AUTO: 11.7 %
MCH RBC QN AUTO: 30.5 PG (ref 26–34)
MCHC RBC AUTO-ENTMCNC: 33.3 G/DL (ref 31–37)
MCV RBC AUTO: 91.6 FL
MONOCYTES # BLD AUTO: 0.75 X10(3) UL (ref 0.1–1)
MONOCYTES NFR BLD AUTO: 10.9 %
NEUTROPHILS # BLD AUTO: 5.18 X10 (3) UL (ref 1.5–7.7)
NEUTROPHILS # BLD AUTO: 5.18 X10(3) UL (ref 1.5–7.7)
NEUTROPHILS NFR BLD AUTO: 75.5 %
OSMOLALITY SERPL CALC.SUM OF ELEC: 274 MOSM/KG (ref 275–295)
PLATELET # BLD AUTO: 181 10(3)UL (ref 150–450)
POTASSIUM SERPL-SCNC: 3.7 MMOL/L (ref 3.5–5.1)
PROT SERPL-MCNC: 7.4 G/DL (ref 6.4–8.2)
RBC # BLD AUTO: 4.52 X10(6)UL
SODIUM SERPL-SCNC: 132 MMOL/L (ref 136–145)
WBC # BLD AUTO: 6.9 X10(3) UL (ref 4–11)

## 2022-03-26 PROCEDURE — 80053 COMPREHEN METABOLIC PANEL: CPT | Performed by: INTERNAL MEDICINE

## 2022-03-26 PROCEDURE — 85025 COMPLETE CBC W/AUTO DIFF WBC: CPT | Performed by: INTERNAL MEDICINE

## 2022-03-26 PROCEDURE — 94640 AIRWAY INHALATION TREATMENT: CPT

## 2022-03-26 RX ORDER — POTASSIUM CHLORIDE 1.5 G/1.77G
40 POWDER, FOR SOLUTION ORAL ONCE
Status: COMPLETED | OUTPATIENT
Start: 2022-03-26 | End: 2022-03-26

## 2022-03-26 NOTE — PLAN OF CARE
Vicente aox4. Hfnc in place on 7L. Up in chair overnight. On IVF as ordered. IV abx cont'd. Call light within reach. Safety precautions in place. Problem: Patient Centered Care  Goal: Patient preferences are identified and integrated in the patient's plan of care  Description: Interventions:  - What would you like us to know as we care for you?  I live at home with my wife  - Provide timely, complete, and accurate information to patient/family  - Incorporate patient and family knowledge, values, beliefs, and cultural backgrounds into the planning and delivery of care  - Encourage patient/family to participate in care and decision-making at the level they choose  - Honor patient and family perspectives and choices  Outcome: Progressing     Problem: Patient/Family Goals  Goal: Patient/Family Long Term Goal  Description: Patient's Long Term Goal: walk without being out of breath    Interventions:  - Pulmonology on consult  -IV antibiotics  -IVF    - See additional Care Plan goals for specific interventions  Outcome: Progressing  Goal: Patient/Family Short Term Goal  Description: Patient's Short Term Goal: catch my breath    Interventions:     - O2 supplement as needed  - IV antibiotics  - Nebulizer treatment    - See additional Care Plan goals for specific interventions  Outcome: Progressing     Problem: RESPIRATORY - ADULT  Goal: Achieves optimal ventilation and oxygenation  Description: INTERVENTIONS:  - Assess for changes in respiratory status  - Assess for changes in mentation and behavior  - Position to facilitate oxygenation and minimize respiratory effort  - Oxygen supplementation based on oxygen saturation or ABGs  - Provide Smoking Cessation handout, if applicable  - Encourage broncho-pulmonary hygiene including cough, deep breathe, Incentive Spirometry  - Assess the need for suctioning and perform as needed  - Assess and instruct to report SOB or any respiratory difficulty  - Respiratory Therapy support as indicated  - Manage/alleviate anxiety  - Monitor for signs/symptoms of CO2 retention  Outcome: Progressing

## 2022-03-27 ENCOUNTER — APPOINTMENT (OUTPATIENT)
Dept: CT IMAGING | Facility: HOSPITAL | Age: 87
End: 2022-03-27
Attending: INTERNAL MEDICINE
Payer: MEDICARE

## 2022-03-27 ENCOUNTER — APPOINTMENT (OUTPATIENT)
Dept: GENERAL RADIOLOGY | Facility: HOSPITAL | Age: 87
End: 2022-03-27
Attending: INTERNAL MEDICINE
Payer: MEDICARE

## 2022-03-27 LAB
ANION GAP SERPL CALC-SCNC: 7 MMOL/L (ref 0–18)
ANION GAP SERPL CALC-SCNC: 7 MMOL/L (ref 0–18)
APTT PPP: 36.7 SECONDS (ref 23.3–35.6)
APTT PPP: 64.8 SECONDS (ref 23.3–35.6)
BUN BLD-MCNC: 10 MG/DL (ref 7–18)
BUN BLD-MCNC: 12 MG/DL (ref 7–18)
BUN/CREAT SERPL: 9 (ref 10–20)
BUN/CREAT SERPL: 9.7 (ref 10–20)
CALCIUM BLD-MCNC: 8.9 MG/DL (ref 8.5–10.1)
CALCIUM BLD-MCNC: 9.1 MG/DL (ref 8.5–10.1)
CHLORIDE SERPL-SCNC: 94 MMOL/L (ref 98–112)
CHLORIDE SERPL-SCNC: 95 MMOL/L (ref 98–112)
CO2 SERPL-SCNC: 26 MMOL/L (ref 21–32)
CO2 SERPL-SCNC: 30 MMOL/L (ref 21–32)
CREAT BLD-MCNC: 1.11 MG/DL
CREAT BLD-MCNC: 1.24 MG/DL
DEPRECATED RDW RBC AUTO: 41.9 FL (ref 35.1–46.3)
ERYTHROCYTE [DISTWIDTH] IN BLOOD BY AUTOMATED COUNT: 13 % (ref 11–15)
GLUCOSE BLD-MCNC: 106 MG/DL (ref 70–99)
GLUCOSE BLD-MCNC: 125 MG/DL (ref 70–99)
HCT VFR BLD AUTO: 41.6 %
HGB BLD-MCNC: 14 G/DL
MCH RBC QN AUTO: 30.5 PG (ref 26–34)
MCHC RBC AUTO-ENTMCNC: 33.7 G/DL (ref 31–37)
MCV RBC AUTO: 90.6 FL
OSMOLALITY SERPL CALC.SUM OF ELEC: 265 MOSM/KG (ref 275–295)
OSMOLALITY SERPL CALC.SUM OF ELEC: 273 MOSM/KG (ref 275–295)
PLATELET # BLD AUTO: 210 10(3)UL (ref 150–450)
POTASSIUM SERPL-SCNC: 4 MMOL/L (ref 3.5–5.1)
POTASSIUM SERPL-SCNC: 4.1 MMOL/L (ref 3.5–5.1)
RBC # BLD AUTO: 4.59 X10(6)UL
SODIUM SERPL-SCNC: 127 MMOL/L (ref 136–145)
SODIUM SERPL-SCNC: 132 MMOL/L (ref 136–145)
WBC # BLD AUTO: 8.7 X10(3) UL (ref 4–11)

## 2022-03-27 PROCEDURE — 80048 BASIC METABOLIC PNL TOTAL CA: CPT | Performed by: INTERNAL MEDICINE

## 2022-03-27 PROCEDURE — 94640 AIRWAY INHALATION TREATMENT: CPT

## 2022-03-27 PROCEDURE — 85730 THROMBOPLASTIN TIME PARTIAL: CPT | Performed by: INTERNAL MEDICINE

## 2022-03-27 PROCEDURE — 85730 THROMBOPLASTIN TIME PARTIAL: CPT | Performed by: NURSE PRACTITIONER

## 2022-03-27 PROCEDURE — 71045 X-RAY EXAM CHEST 1 VIEW: CPT | Performed by: INTERNAL MEDICINE

## 2022-03-27 PROCEDURE — 84132 ASSAY OF SERUM POTASSIUM: CPT | Performed by: INTERNAL MEDICINE

## 2022-03-27 PROCEDURE — 80048 BASIC METABOLIC PNL TOTAL CA: CPT | Performed by: NURSE PRACTITIONER

## 2022-03-27 PROCEDURE — 71260 CT THORAX DX C+: CPT | Performed by: INTERNAL MEDICINE

## 2022-03-27 PROCEDURE — 85027 COMPLETE CBC AUTOMATED: CPT | Performed by: NURSE PRACTITIONER

## 2022-03-27 RX ORDER — LOPERAMIDE HYDROCHLORIDE 2 MG/1
2 CAPSULE ORAL 4 TIMES DAILY PRN
Status: DISCONTINUED | OUTPATIENT
Start: 2022-03-27 | End: 2022-04-04

## 2022-03-27 RX ORDER — HEPARIN SODIUM AND DEXTROSE 10000; 5 [USP'U]/100ML; G/100ML
1000 INJECTION INTRAVENOUS ONCE
Status: COMPLETED | OUTPATIENT
Start: 2022-03-27 | End: 2022-03-27

## 2022-03-27 RX ORDER — ALBUTEROL SULFATE 2.5 MG/3ML
2.5 SOLUTION RESPIRATORY (INHALATION) EVERY 4 HOURS PRN
Status: DISCONTINUED | OUTPATIENT
Start: 2022-03-27 | End: 2022-04-04

## 2022-03-27 RX ORDER — HEPARIN SODIUM AND DEXTROSE 10000; 5 [USP'U]/100ML; G/100ML
INJECTION INTRAVENOUS CONTINUOUS
Status: DISCONTINUED | OUTPATIENT
Start: 2022-03-27 | End: 2022-03-31

## 2022-03-27 NOTE — PLAN OF CARE
Bed low and locked, side rails up x2, call light within reach and pt educated to call when needing assistance. ~0830 pt o2 satting well on 7L HFNC. ~1130 pt stated SOB. Satting only 82 on 7L. This RN started pt on 15L non-rebreather and respiratory administered neb treatment w/ good results. Pt able to transition back to 15L HFNC. Pt up to bathroom and takes ~10 mins to recover O2 sats. ~1600 pt started on heparin gtt and able to wean down to 5L HFNC satting 96%. Plan to be NPO @ 0000 for biopsy tomorrow poss bronch. Will continue to monitor. Problem: Patient Centered Care  Goal: Patient preferences are identified and integrated in the patient's plan of care  Description: Interventions:  - What would you like us to know as we care for you?  I live at home with my wife  - Provide timely, complete, and accurate information to patient/family  - Incorporate patient and family knowledge, values, beliefs, and cultural backgrounds into the planning and delivery of care  - Encourage patient/family to participate in care and decision-making at the level they choose  - Honor patient and family perspectives and choices  Outcome: Progressing     Problem: Patient/Family Goals  Goal: Patient/Family Long Term Goal  Description: Patient's Long Term Goal: walk without being out of breath    Interventions:  - Pulmonology on consult  -IV antibiotics  -IVF    - See additional Care Plan goals for specific interventions  Outcome: Progressing  Goal: Patient/Family Short Term Goal  Description: Patient's Short Term Goal: catch my breath    Interventions:     - O2 supplement as needed  - IV antibiotics  - Nebulizer treatment    - See additional Care Plan goals for specific interventions  Outcome: Progressing     Problem: RESPIRATORY - ADULT  Goal: Achieves optimal ventilation and oxygenation  Description: INTERVENTIONS:  - Assess for changes in respiratory status  - Assess for changes in mentation and behavior  - Position to facilitate oxygenation and minimize respiratory effort  - Oxygen supplementation based on oxygen saturation or ABGs  - Provide Smoking Cessation handout, if applicable  - Encourage broncho-pulmonary hygiene including cough, deep breathe, Incentive Spirometry  - Assess the need for suctioning and perform as needed  - Assess and instruct to report SOB or any respiratory difficulty  - Respiratory Therapy support as indicated  - Manage/alleviate anxiety  - Monitor for signs/symptoms of CO2 retention  Outcome: Progressing

## 2022-03-27 NOTE — PLAN OF CARE
Problem: Patient Centered Care  Goal: Patient preferences are identified and integrated in the patient's plan of care  Description: Interventions:  - What would you like us to know as we care for you?  I live at home with my wife  - Provide timely, complete, and accurate information to patient/family  - Incorporate patient and family knowledge, values, beliefs, and cultural backgrounds into the planning and delivery of care  - Encourage patient/family to participate in care and decision-making at the level they choose  - Honor patient and family perspectives and choices  Outcome: Progressing     Problem: Patient/Family Goals  Goal: Patient/Family Long Term Goal  Description: Patient's Long Term Goal: walk without being out of breath    Interventions:  - Pulmonology on consult  -IV antibiotics  -IVF    - See additional Care Plan goals for specific interventions  Outcome: Progressing  Goal: Patient/Family Short Term Goal  Description: Patient's Short Term Goal: catch my breath    Interventions:     - O2 supplement as needed  - IV antibiotics  - Nebulizer treatment    - See additional Care Plan goals for specific interventions  Outcome: Progressing     Problem: RESPIRATORY - ADULT  Goal: Achieves optimal ventilation and oxygenation  Description: INTERVENTIONS:  - Assess for changes in respiratory status  - Assess for changes in mentation and behavior  - Position to facilitate oxygenation and minimize respiratory effort  - Oxygen supplementation based on oxygen saturation or ABGs  - Provide Smoking Cessation handout, if applicable  - Encourage broncho-pulmonary hygiene including cough, deep breathe, Incentive Spirometry  - Assess the need for suctioning and perform as needed  - Assess and instruct to report SOB or any respiratory difficulty  - Respiratory Therapy support as indicated  - Manage/alleviate anxiety  - Monitor for signs/symptoms of CO2 retention  Outcome: Progressing    HTN treated with HS meds and PRN hydralazine IVP. PRN Neb added for c/o SOB. Sats 97% on 7L HFNC. Will continue to monitor.

## 2022-03-27 NOTE — PLAN OF CARE
Seen by md's & updated, see orders/notes, plan for us guided supraclavicular lymph node biopsy Monday-npo after mn sun noc & hold subcutaneous heparin after last dose sun noc per md, up in chair much of day & family visiting, cont. To monitor & maintain comfort & safety. Problem: Patient Centered Care  Goal: Patient preferences are identified and integrated in the patient's plan of care  Description: Interventions:  - What would you like us to know as we care for you?  I live at home with my wife  - Provide timely, complete, and accurate information to patient/family  - Incorporate patient and family knowledge, values, beliefs, and cultural backgrounds into the planning and delivery of care  - Encourage patient/family to participate in care and decision-making at the level they choose  - Honor patient and family perspectives and choices  Outcome: Progressing     Problem: Patient/Family Goals  Goal: Patient/Family Long Term Goal  Description: Patient's Long Term Goal: walk without being out of breath    Interventions:  - Pulmonology on consult  -IV antibiotics  -IVF    - See additional Care Plan goals for specific interventions  Outcome: Progressing  Goal: Patient/Family Short Term Goal  Description: Patient's Short Term Goal: catch my breath    Interventions:     - O2 supplement as needed  - IV antibiotics  - Nebulizer treatment    - See additional Care Plan goals for specific interventions  Outcome: Progressing     Problem: RESPIRATORY - ADULT  Goal: Achieves optimal ventilation and oxygenation  Description: INTERVENTIONS:  - Assess for changes in respiratory status  - Assess for changes in mentation and behavior  - Position to facilitate oxygenation and minimize respiratory effort  - Oxygen supplementation based on oxygen saturation or ABGs  - Provide Smoking Cessation handout, if applicable  - Encourage broncho-pulmonary hygiene including cough, deep breathe, Incentive Spirometry  - Assess the need for suctioning and perform as needed  - Assess and instruct to report SOB or any respiratory difficulty  - Respiratory Therapy support as indicated  - Manage/alleviate anxiety  - Monitor for signs/symptoms of CO2 retention  Outcome: Progressing

## 2022-03-28 ENCOUNTER — APPOINTMENT (OUTPATIENT)
Dept: GENERAL RADIOLOGY | Facility: HOSPITAL | Age: 87
End: 2022-03-28
Attending: INTERNAL MEDICINE
Payer: MEDICARE

## 2022-03-28 ENCOUNTER — APPOINTMENT (OUTPATIENT)
Dept: ULTRASOUND IMAGING | Facility: HOSPITAL | Age: 87
End: 2022-03-28
Attending: INTERNAL MEDICINE
Payer: MEDICARE

## 2022-03-28 ENCOUNTER — APPOINTMENT (OUTPATIENT)
Dept: INTERVENTIONAL RADIOLOGY/VASCULAR | Facility: HOSPITAL | Age: 87
End: 2022-03-28
Attending: INTERNAL MEDICINE
Payer: MEDICARE

## 2022-03-28 PROBLEM — I48.91 ATRIAL FIBRILLATION (HCC): Status: ACTIVE | Noted: 2022-03-28

## 2022-03-28 PROBLEM — E87.1 HYPONATREMIA: Status: ACTIVE | Noted: 2022-03-28

## 2022-03-28 PROBLEM — R91.8 LUNG MASS: Status: ACTIVE | Noted: 2022-03-28

## 2022-03-28 LAB
ANION GAP SERPL CALC-SCNC: 3 MMOL/L (ref 0–18)
APTT PPP: 35.5 SECONDS (ref 23.3–35.6)
BASOPHILS # BLD AUTO: 0.03 X10(3) UL (ref 0–0.2)
BASOPHILS NFR BLD AUTO: 0.4 %
BUN BLD-MCNC: 12 MG/DL (ref 7–18)
BUN/CREAT SERPL: 9.8 (ref 10–20)
CALCIUM BLD-MCNC: 8.9 MG/DL (ref 8.5–10.1)
CHLORIDE SERPL-SCNC: 94 MMOL/L (ref 98–112)
CO2 SERPL-SCNC: 31 MMOL/L (ref 21–32)
CREAT BLD-MCNC: 1.22 MG/DL
DEPRECATED RDW RBC AUTO: 43.4 FL (ref 35.1–46.3)
EOSINOPHIL # BLD AUTO: 0.12 X10(3) UL (ref 0–0.7)
EOSINOPHIL NFR BLD AUTO: 1.8 %
ERYTHROCYTE [DISTWIDTH] IN BLOOD BY AUTOMATED COUNT: 13.1 % (ref 11–15)
GLUCOSE BLD-MCNC: 102 MG/DL (ref 70–99)
HCT VFR BLD AUTO: 41.7 %
HGB BLD-MCNC: 13.7 G/DL
IMM GRANULOCYTES # BLD AUTO: 0.02 X10(3) UL (ref 0–1)
IMM GRANULOCYTES NFR BLD: 0.3 %
LYMPHOCYTES # BLD AUTO: 0.78 X10(3) UL (ref 1–4)
LYMPHOCYTES NFR BLD AUTO: 11.7 %
MCH RBC QN AUTO: 30.3 PG (ref 26–34)
MCHC RBC AUTO-ENTMCNC: 32.9 G/DL (ref 31–37)
MCV RBC AUTO: 92.3 FL
MONOCYTES # BLD AUTO: 0.73 X10(3) UL (ref 0.1–1)
MONOCYTES NFR BLD AUTO: 10.9 %
NEUTROPHILS # BLD AUTO: 4.99 X10 (3) UL (ref 1.5–7.7)
NEUTROPHILS # BLD AUTO: 4.99 X10(3) UL (ref 1.5–7.7)
NEUTROPHILS NFR BLD AUTO: 74.9 %
OSMOLALITY SERPL CALC.SUM OF ELEC: 266 MOSM/KG (ref 275–295)
PLATELET # BLD AUTO: 194 10(3)UL (ref 150–450)
PLATELET # BLD AUTO: 194 10(3)UL (ref 150–450)
POTASSIUM SERPL-SCNC: 5 MMOL/L (ref 3.5–5.1)
RBC # BLD AUTO: 4.52 X10(6)UL
SARS-COV-2 RNA RESP QL NAA+PROBE: NOT DETECTED
SODIUM SERPL-SCNC: 128 MMOL/L (ref 136–145)
WBC # BLD AUTO: 6.7 X10(3) UL (ref 4–11)

## 2022-03-28 PROCEDURE — 87070 CULTURE OTHR SPECIMN AEROBIC: CPT | Performed by: INTERNAL MEDICINE

## 2022-03-28 PROCEDURE — 88305 TISSUE EXAM BY PATHOLOGIST: CPT | Performed by: INTERNAL MEDICINE

## 2022-03-28 PROCEDURE — 88342 IMHCHEM/IMCYTCHM 1ST ANTB: CPT | Performed by: INTERNAL MEDICINE

## 2022-03-28 PROCEDURE — 94640 AIRWAY INHALATION TREATMENT: CPT

## 2022-03-28 PROCEDURE — 89051 BODY FLUID CELL COUNT: CPT | Performed by: INTERNAL MEDICINE

## 2022-03-28 PROCEDURE — 88363 XM ARCHIVE TISSUE MOLEC ANAL: CPT | Performed by: INTERNAL MEDICINE

## 2022-03-28 PROCEDURE — 89050 BODY FLUID CELL COUNT: CPT | Performed by: INTERNAL MEDICINE

## 2022-03-28 PROCEDURE — 76942 ECHO GUIDE FOR BIOPSY: CPT

## 2022-03-28 PROCEDURE — 07B53ZX EXCISION OF RIGHT AXILLARY LYMPHATIC, PERCUTANEOUS APPROACH, DIAGNOSTIC: ICD-10-PCS | Performed by: RADIOLOGY

## 2022-03-28 PROCEDURE — 80048 BASIC METABOLIC PNL TOTAL CA: CPT | Performed by: INTERNAL MEDICINE

## 2022-03-28 PROCEDURE — 88333 PATH CONSLTJ SURG CYTO XM 1: CPT | Performed by: INTERNAL MEDICINE

## 2022-03-28 PROCEDURE — 85049 AUTOMATED PLATELET COUNT: CPT | Performed by: NURSE PRACTITIONER

## 2022-03-28 PROCEDURE — 84157 ASSAY OF PROTEIN OTHER: CPT | Performed by: INTERNAL MEDICINE

## 2022-03-28 PROCEDURE — 88334 PATH CONSLTJ SURG CYTO XM EA: CPT | Performed by: INTERNAL MEDICINE

## 2022-03-28 PROCEDURE — 38505 NEEDLE BIOPSY LYMPH NODES: CPT

## 2022-03-28 PROCEDURE — 85025 COMPLETE CBC W/AUTO DIFF WBC: CPT | Performed by: INTERNAL MEDICINE

## 2022-03-28 PROCEDURE — 88341 IMHCHEM/IMCYTCHM EA ADD ANTB: CPT | Performed by: INTERNAL MEDICINE

## 2022-03-28 PROCEDURE — 4350F CNSLNG PROVIDED SYMP MNGMNT: CPT | Performed by: INTERNAL MEDICINE

## 2022-03-28 PROCEDURE — 0W993ZZ DRAINAGE OF RIGHT PLEURAL CAVITY, PERCUTANEOUS APPROACH: ICD-10-PCS | Performed by: INTERNAL MEDICINE

## 2022-03-28 PROCEDURE — 88112 CYTOPATH CELL ENHANCE TECH: CPT | Performed by: INTERNAL MEDICINE

## 2022-03-28 PROCEDURE — 87205 SMEAR GRAM STAIN: CPT | Performed by: INTERNAL MEDICINE

## 2022-03-28 PROCEDURE — 83615 LACTATE (LD) (LDH) ENZYME: CPT | Performed by: INTERNAL MEDICINE

## 2022-03-28 PROCEDURE — 85730 THROMBOPLASTIN TIME PARTIAL: CPT | Performed by: INTERNAL MEDICINE

## 2022-03-28 PROCEDURE — 88360 TUMOR IMMUNOHISTOCHEM/MANUAL: CPT | Performed by: INTERNAL MEDICINE

## 2022-03-28 PROCEDURE — 32555 ASPIRATE PLEURA W/ IMAGING: CPT | Performed by: INTERNAL MEDICINE

## 2022-03-28 RX ORDER — LIDOCAINE HYDROCHLORIDE 20 MG/ML
INJECTION, SOLUTION EPIDURAL; INFILTRATION; INTRACAUDAL; PERINEURAL
Status: COMPLETED
Start: 2022-03-28 | End: 2022-03-28

## 2022-03-28 NOTE — CM/SW NOTE
BPCI-Advanced Medicare Program Note:  Plan of care reviewed for care coordination and discharge planning. Noted patient falls under  BPCI/Medicare program, with  for simple pneumonia. VAN tool was used to help determine next care setting. Thus, [address] recommendations is for home health pending patient progress. Case Management team is following for care coordination and discharge planning needs.

## 2022-03-28 NOTE — PROGRESS NOTES
Pt in IR for right sided supraclavicular biopsy in cath lab holding room 7. Time out and universal protocol completed. Pt received 3 of lidocaine to area. Vitals stable pt tolerated procedure well. Report given to [facility]. Type 2 DM (off meds).   - HbA1c WNL

## 2022-03-28 NOTE — PROCEDURES
Thoracentesis Procedure Note    Pre-operative Diagnosis: right pleural effusion     Post-operative Diagnosis: Same    Indications: Diagnostic and therapeutic removal of pleural fluid    Procedure Details   Consent: Informed consent was obtained. Risks of the procedure were discussed including: infection, bleeding, pain, and pneumothorax. Time out was done. Sterile technique was used throughout using chlorhexidine solution and sterile drapes. 1% buffered lidocaine was used to anesthetize the right 8th rib space. Right  side thoracentesis was performed. Fluid was obtained without any difficulties and minimal blood loss. An adhesive bandage was applied to the wound. Findings  1000 ml of cloudy serosanguinous colored pleural fluid was obtained. A sample was sent to for cytology, cell count, chemistry, and bacterial/fungal/AFB culture. Pleural pressure was monitored throughout via the Compass thoracentesis device    Complications:  None; patient tolerated the procedure well. Condition: stable    Plan  A follow up stat portable chest x-ray was ordered      .

## 2022-03-28 NOTE — PLAN OF CARE
Jose Jose is A/Ox4. Up with 2 assist, stand pivot. 5L O2. Complaints of SOB overnight 2x, but O2 remained WDL. Heparin drip paused for plan for bronchoscopy and lymph bx today. IV abx continued. Fall precautions in place, call light within reach. Problem: Patient Centered Care  Goal: Patient preferences are identified and integrated in the patient's plan of care  Description: Interventions:  - What would you like us to know as we care for you?  I live at home with my wife  - Provide timely, complete, and accurate information to patient/family  - Incorporate patient and family knowledge, values, beliefs, and cultural backgrounds into the planning and delivery of care  - Encourage patient/family to participate in care and decision-making at the level they choose  - Honor patient and family perspectives and choices  Outcome: Progressing     Problem: Patient/Family Goals  Goal: Patient/Family Long Term Goal  Description: Patient's Long Term Goal: walk without being out of breath    Interventions:  - Pulmonology on consult  -IV antibiotics  -IVF    - See additional Care Plan goals for specific interventions  Outcome: Progressing  Goal: Patient/Family Short Term Goal  Description: Patient's Short Term Goal: catch my breath    Interventions:     - O2 supplement as needed  - IV antibiotics  - Nebulizer treatment    - See additional Care Plan goals for specific interventions  Outcome: Progressing     Problem: RESPIRATORY - ADULT  Goal: Achieves optimal ventilation and oxygenation  Description: INTERVENTIONS:  - Assess for changes in respiratory status  - Assess for changes in mentation and behavior  - Position to facilitate oxygenation and minimize respiratory effort  - Oxygen supplementation based on oxygen saturation or ABGs  - Provide Smoking Cessation handout, if applicable  - Encourage broncho-pulmonary hygiene including cough, deep breathe, Incentive Spirometry  - Assess the need for suctioning and perform as needed  - Assess and instruct to report SOB or any respiratory difficulty  - Respiratory Therapy support as indicated  - Manage/alleviate anxiety  - Monitor for signs/symptoms of CO2 retention  Outcome: Progressing

## 2022-03-28 NOTE — PLAN OF CARE
Bed low and locked, side rails up x2, call light within reach and pt educated to call when needing assistance. PT SOB again today and needed to recover on 15L non-rebreather. Thoracentesis done by Dr Papa Grace w/ 1L fluid removed. Lymph node biopsy done this afternoon. Bandage is clean, dry, and intact. New orders to transfer pt to oncology unit w/ remote tele. Family at bedside today, and updated on plan of care. Palliative consult ordered. Primary service, pulm, and cardiology okay to transfer pt to onc floor w/ remote tele. Will continue to monitor. Problem: Patient Centered Care  Goal: Patient preferences are identified and integrated in the patient's plan of care  Description: Interventions:  - What would you like us to know as we care for you?  I live at home with my wife  - Provide timely, complete, and accurate information to patient/family  - Incorporate patient and family knowledge, values, beliefs, and cultural backgrounds into the planning and delivery of care  - Encourage patient/family to participate in care and decision-making at the level they choose  - Honor patient and family perspectives and choices  Outcome: Progressing     Problem: Patient/Family Goals  Goal: Patient/Family Long Term Goal  Description: Patient's Long Term Goal: walk without being out of breath    Interventions:  - Pulmonology on consult  -IV antibiotics  -IVF    - See additional Care Plan goals for specific interventions  Outcome: Progressing  Goal: Patient/Family Short Term Goal  Description: Patient's Short Term Goal: catch my breath    Interventions:     - O2 supplement as needed  - IV antibiotics  - Nebulizer treatment    - See additional Care Plan goals for specific interventions  Outcome: Progressing     Problem: RESPIRATORY - ADULT  Goal: Achieves optimal ventilation and oxygenation  Description: INTERVENTIONS:  - Assess for changes in respiratory status  - Assess for changes in mentation and behavior  - Position to facilitate oxygenation and minimize respiratory effort  - Oxygen supplementation based on oxygen saturation or ABGs  - Provide Smoking Cessation handout, if applicable  - Encourage broncho-pulmonary hygiene including cough, deep breathe, Incentive Spirometry  - Assess the need for suctioning and perform as needed  - Assess and instruct to report SOB or any respiratory difficulty  - Respiratory Therapy support as indicated  - Manage/alleviate anxiety  - Monitor for signs/symptoms of CO2 retention  Outcome: Progressing

## 2022-03-29 PROBLEM — Z71.89 GOALS OF CARE, COUNSELING/DISCUSSION: Status: ACTIVE | Noted: 2022-03-29

## 2022-03-29 PROBLEM — Z71.89 ADVANCE CARE PLANNING: Status: ACTIVE | Noted: 2022-03-29

## 2022-03-29 LAB
ANION GAP SERPL CALC-SCNC: 6 MMOL/L (ref 0–18)
APTT PPP: 36.2 SECONDS (ref 23.3–35.6)
APTT PPP: 41.6 SECONDS (ref 23.3–35.6)
APTT PPP: 60.6 SECONDS (ref 23.3–35.6)
BASOPHILS # BLD AUTO: 0.03 X10(3) UL (ref 0–0.2)
BASOPHILS NFR BLD AUTO: 0.5 %
BASOPHILS NFR PLR: 0 %
BUN BLD-MCNC: 13 MG/DL (ref 7–18)
BUN/CREAT SERPL: 12 (ref 10–20)
CALCIUM BLD-MCNC: 8.7 MG/DL (ref 8.5–10.1)
CHLORIDE SERPL-SCNC: 93 MMOL/L (ref 98–112)
CO2 SERPL-SCNC: 31 MMOL/L (ref 21–32)
COLOR FLD: YELLOW
CREAT BLD-MCNC: 1.08 MG/DL
DEPRECATED RDW RBC AUTO: 42.2 FL (ref 35.1–46.3)
EOSINOPHIL # BLD AUTO: 0.14 X10(3) UL (ref 0–0.7)
EOSINOPHIL NFR BLD AUTO: 2.3 %
EOSINOPHIL NFR PLR: 0 %
ERYTHROCYTE [DISTWIDTH] IN BLOOD BY AUTOMATED COUNT: 12.9 % (ref 11–15)
GLUCOSE BLD-MCNC: 85 MG/DL (ref 70–99)
HCT VFR BLD AUTO: 38.8 %
HGB BLD-MCNC: 12.9 G/DL
IMM GRANULOCYTES # BLD AUTO: 0.02 X10(3) UL (ref 0–1)
IMM GRANULOCYTES NFR BLD: 0.3 %
LDH FLD L TO P-CCNC: 192 U/L
LYMPHOCYTES # BLD AUTO: 0.63 X10(3) UL (ref 1–4)
LYMPHOCYTES NFR BLD AUTO: 10.2 %
LYMPHOCYTES NFR PLR: 95 %
MAGNESIUM SERPL-MCNC: 2.1 MG/DL (ref 1.6–2.6)
MCH RBC QN AUTO: 30.4 PG (ref 26–34)
MCHC RBC AUTO-ENTMCNC: 33.2 G/DL (ref 31–37)
MCV RBC AUTO: 91.5 FL
MESOTHL CELL NFR PLR: 1 %
MONOCYTES # BLD AUTO: 0.72 X10(3) UL (ref 0.1–1)
MONOCYTES NFR BLD AUTO: 11.7 %
MONOS+MACROS NFR PLR: 3 %
NEUTROPHILS # BLD AUTO: 4.61 X10 (3) UL (ref 1.5–7.7)
NEUTROPHILS # BLD AUTO: 4.61 X10(3) UL (ref 1.5–7.7)
NEUTROPHILS NFR BLD AUTO: 75 %
NEUTROPHILS NFR PLR: 1 %
OSMOLALITY SERPL CALC.SUM OF ELEC: 269 MOSM/KG (ref 275–295)
PHOSPHATE SERPL-MCNC: 2.7 MG/DL (ref 2.5–4.9)
PLATELET # BLD AUTO: 173 10(3)UL (ref 150–450)
PLATELET # BLD AUTO: 173 10(3)UL (ref 150–450)
POTASSIUM SERPL-SCNC: 4 MMOL/L (ref 3.5–5.1)
RBC # BLD AUTO: 4.24 X10(6)UL
RBC # FLD: ABNORMAL /CUMM (ref ?–1)
SODIUM SERPL-SCNC: 130 MMOL/L (ref 136–145)
TOTAL CELLS COUNTED FLD: 100
WBC # BLD AUTO: 6.2 X10(3) UL (ref 4–11)
WBC # FLD: 1083 /CUMM (ref ?–1)

## 2022-03-29 PROCEDURE — 94640 AIRWAY INHALATION TREATMENT: CPT

## 2022-03-29 PROCEDURE — 97162 PT EVAL MOD COMPLEX 30 MIN: CPT

## 2022-03-29 PROCEDURE — 84100 ASSAY OF PHOSPHORUS: CPT | Performed by: INTERNAL MEDICINE

## 2022-03-29 PROCEDURE — 85025 COMPLETE CBC W/AUTO DIFF WBC: CPT | Performed by: INTERNAL MEDICINE

## 2022-03-29 PROCEDURE — 80048 BASIC METABOLIC PNL TOTAL CA: CPT | Performed by: INTERNAL MEDICINE

## 2022-03-29 PROCEDURE — 85049 AUTOMATED PLATELET COUNT: CPT | Performed by: NURSE PRACTITIONER

## 2022-03-29 PROCEDURE — 97116 GAIT TRAINING THERAPY: CPT

## 2022-03-29 PROCEDURE — 83735 ASSAY OF MAGNESIUM: CPT | Performed by: INTERNAL MEDICINE

## 2022-03-29 PROCEDURE — 97530 THERAPEUTIC ACTIVITIES: CPT

## 2022-03-29 PROCEDURE — 97166 OT EVAL MOD COMPLEX 45 MIN: CPT

## 2022-03-29 PROCEDURE — 85730 THROMBOPLASTIN TIME PARTIAL: CPT | Performed by: INTERNAL MEDICINE

## 2022-03-29 RX ORDER — HEPARIN SODIUM 1000 [USP'U]/ML
30 INJECTION, SOLUTION INTRAVENOUS; SUBCUTANEOUS ONCE
Status: COMPLETED | OUTPATIENT
Start: 2022-03-29 | End: 2022-03-29

## 2022-03-29 RX ORDER — METOPROLOL SUCCINATE 100 MG/1
100 TABLET, EXTENDED RELEASE ORAL DAILY
Status: DISCONTINUED | OUTPATIENT
Start: 2022-03-29 | End: 2022-03-29

## 2022-03-29 RX ORDER — SOTALOL HYDROCHLORIDE 80 MG/1
80 TABLET ORAL EVERY 12 HOURS SCHEDULED
Status: DISCONTINUED | OUTPATIENT
Start: 2022-03-29 | End: 2022-04-04

## 2022-03-29 RX ORDER — METOPROLOL SUCCINATE 50 MG/1
50 TABLET, EXTENDED RELEASE ORAL DAILY
Status: DISCONTINUED | OUTPATIENT
Start: 2022-03-30 | End: 2022-04-04

## 2022-03-29 NOTE — CM/SW NOTE
03/29/22 1500   CM/SW Referral Data   Referral Source Social Work (self-referral)   Reason for Referral Discharge planning   Informant Patient;Spouse/Significant Other;Daughter   Pertinent Medical Hx   Does patient have an established PCP? Yes   Patient Info   Advanced directives? Yes   Patient's Current Mental Status at Time of Assessment Confused or unable to complete assessment   Patient Communication Issues   (Asleep)   Patient's [address]   Number of Levels in Home 1   Number of Stair in Home 5   Patient lives with Spouse/Significant other   Patient Status Prior to Admission   Independent with ADLs and Mobility Yes   Discharge Needs   Anticipated D/C needs Home health care;Caregiver services; Medical equipment     SW initiated self referral for DC planning. Met with patient, wife Saranya Tirado, and daughter Saira Centeno at bedside, introduced self and role. Patient lives with his spouse at (address correct on face sheet). Prior to hospitalization, patient was independent with ADL's, has a cane/walker at home. Wife Saranya Tirado reports that she will be at home with patient for 24 care/support. Patient children will also be assisting with care. SW discussed PT rec for home health, family in agreement to home health, requesting home health aide to be added to order if possible. Family requesting wheel chair and bedside commode. Also requesting ramp resources, caregiver, and VA resources. SW to provide. MD will need to document the following in progress note for wheelchair  **Please copy & paste and thelma (X) for all that apply in A-E**    (  ) A. The beneficiary has a mobility limitation that significantly impairs his/her ability to participate in one or more mobility-related activities of daily living.   (  ) B. The beneficiary's mobility limitation cannot be sufficiently resolved by the use of an appropriately fitted cane or walker. (  ) C.  The beneficiary's home provides adequate access between rooms, maneuvering space, and surfaces for use of the manual wheelchair that is provided. (  ) D. Use of the wheelchair will significantly improve the beneficiary's ability to participates in MRADL's and beneficiary will use it on a regular basis in the home. (  ) E. The beneficiary has not expressed an unwillingness to use the manual wheelchair that is provided in the home. AND please thelma (X) for F or G  (  ) F. The beneficiary has sufficient physical and mental capabilities needed to safely self-propel the wheelchair that is provided in the home during a typical day. (  ) G. The beneficiary has a caregiver who is available, willing and able to provide assistance with the wheelchair. PLAN: Home with home health, referrals pending in [address] [place]. Need to provide choice list when avail, orders/f2f placed. Referral pending to E for wheel chair and commode. Need MD documentation and orders signed. Message sent to MD via [employer] secure chat for wheel chair doc.        ABEL Olmstead, [facility]    [id number]

## 2022-03-29 NOTE — PLAN OF CARE
Problem: Patient Centered Care  Goal: Patient preferences are identified and integrated in the patient's plan of care  Description: Interventions:  - What would you like us to know as we care for you? I live at home with my wife  - Provide timely, complete, and accurate information to patient/family  - Incorporate patient and family knowledge, values, beliefs, and cultural backgrounds into the planning and delivery of care  - Encourage patient/family to participate in care and decision-making at the level they choose  - Honor patient and family perspectives and choices  Outcome: Progressing     Problem: Patient/Family Goals  Goal: Patient/Family Long Term Goal  Description: Patient's Long Term Goal: walk without being out of breath    Interventions:  - Pulmonology on consult  -IV antibiotics  -IVF    - See additional Care Plan goals for specific interventions  Outcome: Progressing  Goal: Patient/Family Short Term Goal  Description: Patient's Short Term Goal: catch my breath    Interventions:     - O2 supplement as needed  - IV antibiotics  - Nebulizer treatment    - See additional Care Plan goals for specific interventions  Outcome: Progressing     Problem: RESPIRATORY - ADULT  Goal: Achieves optimal ventilation and oxygenation  Description: INTERVENTIONS:  - Assess for changes in respiratory status  - Assess for changes in mentation and behavior  - Position to facilitate oxygenation and minimize respiratory effort  - Oxygen supplementation based on oxygen saturation or ABGs  - Provide Smoking Cessation handout, if applicable  - Encourage broncho-pulmonary hygiene including cough, deep breathe, Incentive Spirometry  - Assess the need for suctioning and perform as needed  - Assess and instruct to report SOB or any respiratory difficulty  - Respiratory Therapy support as indicated  - Manage/alleviate anxiety  - Monitor for signs/symptoms of CO2 retention  Outcome: Progressing   Patient arrived to the floor from [facility] Alert and oriented X4. Vital signs stable. Oxygen at 3L in place while on rest, desats easily and very short of breath with exertion. Up with 2 assist and rolling chair. Generalized weakness. Voids using the urinal. No bowel movement overnight. IV Zosyn continued. Heparin drip to be restarted at 0600 per order. Wife at the bedside. Plan of care reviewed. Oriented to unit. Fall precautions in place. Will continue to monitor.

## 2022-03-29 NOTE — PLAN OF CARE
Pt A/O x4. 3L of O2. SOB at rest & on exertion. Hep gtt restarted this AM, running at 13mL/hr; next PTT to be drawn @ 2010. IV abx continued. Tolerating diet. PT/OT. No calls from tele. Lung bx path pending. Fall precautions in place. Call light within reach. Calls appropriately. Frequent rounding. Wife and daughter at bedside aware of care of plan. Problem: Patient Centered Care  Goal: Patient preferences are identified and integrated in the patient's plan of care  Description: Interventions:  - What would you like us to know as we care for you?  I live at home with my wife  - Provide timely, complete, and accurate information to patient/family  - Incorporate patient and family knowledge, values, beliefs, and cultural backgrounds into the planning and delivery of care  - Encourage patient/family to participate in care and decision-making at the level they choose  - Honor patient and family perspectives and choices  Outcome: Progressing     Problem: Patient/Family Goals  Goal: Patient/Family Long Term Goal  Description: Patient's Long Term Goal: walk without being out of breath    Interventions:  - Pulmonology on consult  -IV antibiotics  -IVF    - See additional Care Plan goals for specific interventions  Outcome: Progressing  Goal: Patient/Family Short Term Goal  Description: Patient's Short Term Goal: catch my breath    Interventions:     - O2 supplement as needed  - IV antibiotics  - Nebulizer treatment    - See additional Care Plan goals for specific interventions  Outcome: Progressing     Problem: RESPIRATORY - ADULT  Goal: Achieves optimal ventilation and oxygenation  Description: INTERVENTIONS:  - Assess for changes in respiratory status  - Assess for changes in mentation and behavior  - Position to facilitate oxygenation and minimize respiratory effort  - Oxygen supplementation based on oxygen saturation or ABGs  - Provide Smoking Cessation handout, if applicable  - Encourage broncho-pulmonary hygiene including cough, deep breathe, Incentive Spirometry  - Assess the need for suctioning and perform as needed  - Assess and instruct to report SOB or any respiratory difficulty  - Respiratory Therapy support as indicated  - Manage/alleviate anxiety  - Monitor for signs/symptoms of CO2 retention  Outcome: Progressing     Problem: PAIN - ADULT  Goal: Verbalizes/displays adequate comfort level or patient's stated pain goal  Description: INTERVENTIONS:  - Encourage pt to monitor pain and request assistance  - Assess pain using appropriate pain scale  - Administer analgesics based on type and severity of pain and evaluate response  - Implement non-pharmacological measures as appropriate and evaluate response  - Consider cultural and social influences on pain and pain management  - Manage/alleviate anxiety  - Utilize distraction and/or relaxation techniques  - Monitor for opioid side effects  - Notify MD/LIP if interventions unsuccessful or patient reports new pain  - Anticipate increased pain with activity and pre-medicate as appropriate  Outcome: Progressing     Problem: SAFETY ADULT - FALL  Goal: Free from fall injury  Description: INTERVENTIONS:  - Assess pt frequently for physical needs  - Identify cognitive and physical deficits and behaviors that affect risk of falls.   - [place] fall precautions as indicated by assessment.  - Educate pt/family on patient safety including physical limitations  - Instruct pt to call for assistance with activity based on assessment  - Modify environment to reduce risk of injury  - Provide assistive devices as appropriate  - Consider OT/PT consult to assist with strengthening/mobility  - Encourage toileting schedule  Outcome: Progressing     Problem: DISCHARGE PLANNING  Goal: Discharge to home or other facility with appropriate resources  Description: INTERVENTIONS:  - Identify barriers to discharge w/pt and caregiver  - Include patient/family/discharge partner in discharge planning  - Arrange for needed discharge resources and transportation as appropriate  - Identify discharge learning needs (meds, wound care, etc)  - Arrange for interpreters to assist at discharge as needed  - Consider post-discharge preferences of patient/family/discharge partner  - Complete POLST form as appropriate  - Assess patient's ability to be responsible for managing their own health  - Refer to Case Management Department for coordinating discharge planning if the patient needs post-hospital services based on physician/LIP order or complex needs related to functional status, cognitive ability or social support system  Outcome: Progressing     Problem: GASTROINTESTINAL - ADULT  Goal: Maintains or returns to baseline bowel function  Description: INTERVENTIONS:  - Assess bowel function  - Maintain adequate hydration with IV or PO as ordered and tolerated  - Evaluate effectiveness of GI medications  - Encourage mobilization and activity  - Obtain nutritional consult as needed  - Establish a toileting routine/schedule  - Consider collaborating with pharmacy to review patient's medication profile  Outcome: Progressing     Problem: HEMATOLOGIC - ADULT  Goal: Free from bleeding injury  Description: (Example usage: patient with low platelets)  INTERVENTIONS:  - Avoid intramuscular injections, enemas and rectal medication administration  - Ensure safe mobilization of patient  - Hold pressure on venipuncture sites to achieve adequate hemostasis  - Assess for signs and symptoms of internal bleeding  - Monitor lab trends  - Patient is to report abnormal signs of bleeding to staff  - Avoid use of toothpicks and dental floss  - Use electric shaver for shaving  - Use soft bristle tooth brush  - Limit straining and forceful nose blowing  Outcome: Progressing     Problem: MUSCULOSKELETAL - ADULT  Goal: Return mobility to safest level of function  Description: INTERVENTIONS:  - Assess patient stability and activity tolerance for standing, transferring and ambulating w/ or w/o assistive devices  - Assist with transfers and ambulation using safe patient handling equipment as needed  - Ensure adequate protection for wounds/incisions during mobilization  - Obtain PT/OT consults as needed  - Advance activity as appropriate  - Communicate ordered activity level and limitations with patient/family  Outcome: Progressing  Goal: Maintain proper alignment of affected body part  Description: INTERVENTIONS:  - Support and protect limb and body alignment per provider's orders  - Instruct and reinforce with patient and family use of appropriate assistive device and precautions (e.g. spinal or hip dislocation precautions)  Outcome: Progressing

## 2022-03-30 ENCOUNTER — APPOINTMENT (OUTPATIENT)
Dept: GENERAL RADIOLOGY | Facility: HOSPITAL | Age: 87
End: 2022-03-30
Attending: INTERNAL MEDICINE
Payer: MEDICARE

## 2022-03-30 LAB
ANION GAP SERPL CALC-SCNC: 3 MMOL/L (ref 0–18)
ANION GAP SERPL CALC-SCNC: 5 MMOL/L (ref 0–18)
APTT PPP: 57.8 SECONDS (ref 23.3–35.6)
APTT PPP: 77.3 SECONDS (ref 23.3–35.6)
BASOPHILS # BLD AUTO: 0.05 X10(3) UL (ref 0–0.2)
BASOPHILS NFR BLD AUTO: 0.8 %
BILIRUB UR QL: NEGATIVE
BUN BLD-MCNC: 15 MG/DL (ref 7–18)
BUN BLD-MCNC: 15 MG/DL (ref 7–18)
BUN/CREAT SERPL: 13.2 (ref 10–20)
BUN/CREAT SERPL: 13.2 (ref 10–20)
CALCIUM BLD-MCNC: 8.5 MG/DL (ref 8.5–10.1)
CALCIUM BLD-MCNC: 8.6 MG/DL (ref 8.5–10.1)
CHLORIDE SERPL-SCNC: 92 MMOL/L (ref 98–112)
CHLORIDE SERPL-SCNC: 94 MMOL/L (ref 98–112)
CO2 SERPL-SCNC: 32 MMOL/L (ref 21–32)
CO2 SERPL-SCNC: 32 MMOL/L (ref 21–32)
CREAT BLD-MCNC: 1.14 MG/DL
CREAT BLD-MCNC: 1.14 MG/DL
DEPRECATED RDW RBC AUTO: 41.3 FL (ref 35.1–46.3)
DEPRECATED RDW RBC AUTO: 42 FL (ref 35.1–46.3)
EOSINOPHIL # BLD AUTO: 0.24 X10(3) UL (ref 0–0.7)
EOSINOPHIL NFR BLD AUTO: 4 %
ERYTHROCYTE [DISTWIDTH] IN BLOOD BY AUTOMATED COUNT: 12.7 % (ref 11–15)
ERYTHROCYTE [DISTWIDTH] IN BLOOD BY AUTOMATED COUNT: 12.7 % (ref 11–15)
GLUCOSE BLD-MCNC: 103 MG/DL (ref 70–99)
GLUCOSE BLD-MCNC: 106 MG/DL (ref 70–99)
GLUCOSE UR-MCNC: NEGATIVE MG/DL
HCT VFR BLD AUTO: 38.7 %
HCT VFR BLD AUTO: 39 %
HGB BLD-MCNC: 13.1 G/DL
HGB BLD-MCNC: 13.2 G/DL
IMM GRANULOCYTES # BLD AUTO: 0.02 X10(3) UL (ref 0–1)
IMM GRANULOCYTES NFR BLD: 0.3 %
LEUKOCYTE ESTERASE UR QL STRIP.AUTO: NEGATIVE
LYMPHOCYTES # BLD AUTO: 0.61 X10(3) UL (ref 1–4)
LYMPHOCYTES NFR BLD AUTO: 10.2 %
MAGNESIUM SERPL-MCNC: 2 MG/DL (ref 1.6–2.6)
MCH RBC QN AUTO: 30.6 PG (ref 26–34)
MCH RBC QN AUTO: 31.1 PG (ref 26–34)
MCHC RBC AUTO-ENTMCNC: 33.6 G/DL (ref 31–37)
MCHC RBC AUTO-ENTMCNC: 34.1 G/DL (ref 31–37)
MCV RBC AUTO: 91.1 FL
MCV RBC AUTO: 91.1 FL
MONOCYTES # BLD AUTO: 0.72 X10(3) UL (ref 0.1–1)
MONOCYTES NFR BLD AUTO: 12 %
NEUTROPHILS # BLD AUTO: 4.34 X10 (3) UL (ref 1.5–7.7)
NEUTROPHILS # BLD AUTO: 4.34 X10(3) UL (ref 1.5–7.7)
NEUTROPHILS NFR BLD AUTO: 72.7 %
NITRITE UR QL STRIP.AUTO: NEGATIVE
OSMOLALITY SERPL CALC.SUM OF ELEC: 265 MOSM/KG (ref 275–295)
OSMOLALITY SERPL CALC.SUM OF ELEC: 273 MOSM/KG (ref 275–295)
PH UR: 6 [PH] (ref 5–8)
PLATELET # BLD AUTO: 166 10(3)UL (ref 150–450)
PLATELET # BLD AUTO: 168 10(3)UL (ref 150–450)
PLATELET # BLD AUTO: 168 10(3)UL (ref 150–450)
POTASSIUM SERPL-SCNC: 3.8 MMOL/L (ref 3.5–5.1)
POTASSIUM SERPL-SCNC: 4.2 MMOL/L (ref 3.5–5.1)
PROT UR-MCNC: 100 MG/DL
RBC # BLD AUTO: 4.25 X10(6)UL
RBC # BLD AUTO: 4.28 X10(6)UL
RBC #/AREA URNS AUTO: >10 /HPF
SODIUM SERPL-SCNC: 127 MMOL/L (ref 136–145)
SODIUM SERPL-SCNC: 131 MMOL/L (ref 136–145)
SP GR UR STRIP: 1.02 (ref 1–1.03)
UROBILINOGEN UR STRIP-ACNC: <2
VIT C UR-MCNC: NEGATIVE MG/DL
WBC # BLD AUTO: 5.8 X10(3) UL (ref 4–11)
WBC # BLD AUTO: 6 X10(3) UL (ref 4–11)

## 2022-03-30 PROCEDURE — 97530 THERAPEUTIC ACTIVITIES: CPT

## 2022-03-30 PROCEDURE — 85025 COMPLETE CBC W/AUTO DIFF WBC: CPT | Performed by: INTERNAL MEDICINE

## 2022-03-30 PROCEDURE — 81001 URINALYSIS AUTO W/SCOPE: CPT | Performed by: INTERNAL MEDICINE

## 2022-03-30 PROCEDURE — 71045 X-RAY EXAM CHEST 1 VIEW: CPT | Performed by: INTERNAL MEDICINE

## 2022-03-30 PROCEDURE — 85049 AUTOMATED PLATELET COUNT: CPT | Performed by: NURSE PRACTITIONER

## 2022-03-30 PROCEDURE — 85730 THROMBOPLASTIN TIME PARTIAL: CPT | Performed by: INTERNAL MEDICINE

## 2022-03-30 PROCEDURE — 93010 ELECTROCARDIOGRAM REPORT: CPT | Performed by: NURSE PRACTITIONER

## 2022-03-30 PROCEDURE — 80048 BASIC METABOLIC PNL TOTAL CA: CPT | Performed by: INTERNAL MEDICINE

## 2022-03-30 PROCEDURE — 93005 ELECTROCARDIOGRAM TRACING: CPT

## 2022-03-30 PROCEDURE — 83735 ASSAY OF MAGNESIUM: CPT | Performed by: NURSE PRACTITIONER

## 2022-03-30 PROCEDURE — 94640 AIRWAY INHALATION TREATMENT: CPT

## 2022-03-30 PROCEDURE — 80048 BASIC METABOLIC PNL TOTAL CA: CPT | Performed by: NURSE PRACTITIONER

## 2022-03-30 PROCEDURE — 85027 COMPLETE CBC AUTOMATED: CPT | Performed by: NURSE PRACTITIONER

## 2022-03-30 RX ORDER — ALBUTEROL SULFATE 2.5 MG/3ML
2.5 SOLUTION RESPIRATORY (INHALATION)
Status: DISCONTINUED | OUTPATIENT
Start: 2022-03-30 | End: 2022-03-30

## 2022-03-30 RX ORDER — METHYLPREDNISOLONE SODIUM SUCCINATE 40 MG/ML
40 INJECTION, POWDER, LYOPHILIZED, FOR SOLUTION INTRAMUSCULAR; INTRAVENOUS EVERY 12 HOURS
Status: DISCONTINUED | OUTPATIENT
Start: 2022-03-30 | End: 2022-03-31

## 2022-03-30 RX ORDER — ACETAMINOPHEN 325 MG/1
650 TABLET ORAL EVERY 6 HOURS PRN
Status: DISCONTINUED | OUTPATIENT
Start: 2022-03-30 | End: 2022-04-04

## 2022-03-30 RX ORDER — ACETYLCYSTEINE 200 MG/ML
2 SOLUTION ORAL; RESPIRATORY (INHALATION) 2 TIMES DAILY
Status: DISCONTINUED | OUTPATIENT
Start: 2022-03-30 | End: 2022-04-04

## 2022-03-30 RX ORDER — IPRATROPIUM BROMIDE AND ALBUTEROL SULFATE 2.5; .5 MG/3ML; MG/3ML
3 SOLUTION RESPIRATORY (INHALATION)
Status: DISCONTINUED | OUTPATIENT
Start: 2022-03-30 | End: 2022-04-04

## 2022-03-30 NOTE — HOME CARE LIAISON
Received referral via [employer] for Home Health services. Spoke w/ patient's wife/Anat and provided with list of [facility] providers from [facility], choice is [address] [facility]. Agency reserved in [facility] and contact information placed on AVS.Financial interest disclosure provided. Notified SW/Lisa Henley.

## 2022-03-30 NOTE — CM/SW NOTE
Follow up on DME (wheel chair and commode)    MD documentation progress note sent to [facility]. Orders placed, awaiting MD domínguez. 935am  Signed orders for wheel chair and commode sent to [facility].     232pm  Additional caregiver and VA benefit information provided to daughter, Papito Hancock at bedside. Confirmed acceptance to [facility]. PLAN: Home with [facility] (orders/f2f placed). Referral and order placed to [facility] for wheel chair and commode, pending delivery. Monitor O2. Likely will need transportation assistance home.      ABEL Laird, [facility]    [id number]

## 2022-03-30 NOTE — PLAN OF CARE
Pt is alert and oriented x4. Oxygen sat remains stable on 4L O2 NC. MD made aware of continued shortness of breath, chest x-ray ordered for morning. Scheduled nebs. Pt declined PRN Nebs. Remote tele in place. Heparin gtt infusing. PTT was therapeutic at 13ml/hr next PTT is with morning labs. Up x2 stand and pivot to the rolling chair. PRN imodium given for diarrhea. Zosyn for IV abx. Family at bedside. Call light within reach. Fall precautions in place. Problem: Patient Centered Care  Goal: Patient preferences are identified and integrated in the patient's plan of care  Description: Interventions:  - What would you like us to know as we care for you?  I live at home with my wife  - Provide timely, complete, and accurate information to patient/family  - Incorporate patient and family knowledge, values, beliefs, and cultural backgrounds into the planning and delivery of care  - Encourage patient/family to participate in care and decision-making at the level they choose  - Honor patient and family perspectives and choices  Outcome: Progressing     Problem: Patient/Family Goals  Goal: Patient/Family Long Term Goal  Description: Patient's Long Term Goal: walk without being out of breath    Interventions:  - Pulmonology on consult  -IV antibiotics  -IVF    - See additional Care Plan goals for specific interventions  Outcome: Progressing  Goal: Patient/Family Short Term Goal  Description: Patient's Short Term Goal: catch my breath    Interventions:     - O2 supplement as needed  - IV antibiotics  - Nebulizer treatment    - See additional Care Plan goals for specific interventions  Outcome: Progressing     Problem: RESPIRATORY - ADULT  Goal: Achieves optimal ventilation and oxygenation  Description: INTERVENTIONS:  - Assess for changes in respiratory status  - Assess for changes in mentation and behavior  - Position to facilitate oxygenation and minimize respiratory effort  - Oxygen supplementation based on oxygen saturation or ABGs  - Provide Smoking Cessation handout, if applicable  - Encourage broncho-pulmonary hygiene including cough, deep breathe, Incentive Spirometry  - Assess the need for suctioning and perform as needed  - Assess and instruct to report SOB or any respiratory difficulty  - Respiratory Therapy support as indicated  - Manage/alleviate anxiety  - Monitor for signs/symptoms of CO2 retention  Outcome: Progressing     Problem: PAIN - ADULT  Goal: Verbalizes/displays adequate comfort level or patient's stated pain goal  Description: INTERVENTIONS:  - Encourage pt to monitor pain and request assistance  - Assess pain using appropriate pain scale  - Administer analgesics based on type and severity of pain and evaluate response  - Implement non-pharmacological measures as appropriate and evaluate response  - Consider cultural and social influences on pain and pain management  - Manage/alleviate anxiety  - Utilize distraction and/or relaxation techniques  - Monitor for opioid side effects  - Notify MD/LIP if interventions unsuccessful or patient reports new pain  - Anticipate increased pain with activity and pre-medicate as appropriate  Outcome: Progressing     Problem: SAFETY ADULT - FALL  Goal: Free from fall injury  Description: INTERVENTIONS:  - Assess pt frequently for physical needs  - Identify cognitive and physical deficits and behaviors that affect risk of falls.   - [place] fall precautions as indicated by assessment.  - Educate pt/family on patient safety including physical limitations  - Instruct pt to call for assistance with activity based on assessment  - Modify environment to reduce risk of injury  - Provide assistive devices as appropriate  - Consider OT/PT consult to assist with strengthening/mobility  - Encourage toileting schedule  Outcome: Progressing     Problem: DISCHARGE PLANNING  Goal: Discharge to home or other facility with appropriate resources  Description: INTERVENTIONS:  - Identify barriers to discharge w/pt and caregiver  - Include patient/family/discharge partner in discharge planning  - Arrange for needed discharge resources and transportation as appropriate  - Identify discharge learning needs (meds, wound care, etc)  - Arrange for interpreters to assist at discharge as needed  - Consider post-discharge preferences of patient/family/discharge partner  - Complete POLST form as appropriate  - Assess patient's ability to be responsible for managing their own health  - Refer to Case Management Department for coordinating discharge planning if the patient needs post-hospital services based on physician/LIP order or complex needs related to functional status, cognitive ability or social support system  Outcome: Progressing     Problem: GASTROINTESTINAL - ADULT  Goal: Maintains or returns to baseline bowel function  Description: INTERVENTIONS:  - Assess bowel function  - Maintain adequate hydration with IV or PO as ordered and tolerated  - Evaluate effectiveness of GI medications  - Encourage mobilization and activity  - Obtain nutritional consult as needed  - Establish a toileting routine/schedule  - Consider collaborating with pharmacy to review patient's medication profile  Outcome: Progressing     Problem: HEMATOLOGIC - ADULT  Goal: Free from bleeding injury  Description: (Example usage: patient with low platelets)  INTERVENTIONS:  - Avoid intramuscular injections, enemas and rectal medication administration  - Ensure safe mobilization of patient  - Hold pressure on venipuncture sites to achieve adequate hemostasis  - Assess for signs and symptoms of internal bleeding  - Monitor lab trends  - Patient is to report abnormal signs of bleeding to staff  - Avoid use of toothpicks and dental floss  - Use electric shaver for shaving  - Use soft bristle tooth brush  - Limit straining and forceful nose blowing  Outcome: Progressing     Problem: MUSCULOSKELETAL - ADULT  Goal: Return mobility to safest level of function  Description: INTERVENTIONS:  - Assess patient stability and activity tolerance for standing, transferring and ambulating w/ or w/o assistive devices  - Assist with transfers and ambulation using safe patient handling equipment as needed  - Ensure adequate protection for wounds/incisions during mobilization  - Obtain PT/OT consults as needed  - Advance activity as appropriate  - Communicate ordered activity level and limitations with patient/family  Outcome: Progressing  Goal: Maintain proper alignment of affected body part  Description: INTERVENTIONS:  - Support and protect limb and body alignment per provider's orders  - Instruct and reinforce with patient and family use of appropriate assistive device and precautions (e.g. spinal or hip dislocation precautions)  Outcome: Progressing

## 2022-03-30 NOTE — PROCEDURES
[facility] HOSP - [facility]  Procedure Note    Cristi Johnson Patient Status:  Inpatient    10/27/1935 MRN [id number]   Location [facility] 4W/SW/SE Attending Noe Craig MD   Hosp Day # 6 PCP Eliana Rios MD     Procedure: Ultrasound guided right supraclavicular lymph node biopsy    Pre-Procedure Diagnosis: Hypoxia [R09.02]  Dyspnea, unspecified type [R06.00]      Post-Procedure Diagnosis: Hypoxia [R09.02]  Dyspnea, unspecified type [R06.00]    Anesthesia:  Local    Findings:  Under ultrasound guidance, three 18G core biopsy samples obtained from right supraclavicular lymph node. Adequacy of sampling confirmed by pathology. Specimens: Three 18G core lymph node to pathology    Blood Loss:  2mL      Complications:  None    Drains:  None    Plan:  Transfer to floor.     Alie Turcios MD  3/30/2022

## 2022-03-30 NOTE — PHYSICAL THERAPY NOTE
Chart reviewed, attempted to see pt for PT tx. Pt received up in chair on 5L O2 HF. Reports increased SOB, fatigue, and \"vertigo\" today. Rates dizziness at 4/10 at rest. Pt requesting to hold PT today d/t vertigo. Anticipate pt with likely positioning vertigo d/t c/o symptoms with rolling to L, lying flat, L cervical rotation. Pt educated on vestibular pathology and tx. Positional vertigo is treated with a repositioning maneuver vs habituation exercise. Discussed pt needs to move to tolerance to reduce symptoms. Pt with fair understanding. Would recommend positioning maneuver however unsure if pt will be able to tolerate d/t SOB. Respiratory therapy arrived for breathing tx. Will f/u later this morning for mobility assessment and vestibular eval as appropriate.     Astrid Echevarria, DPT  Physical Therapy  [facility] [place] [facility] #[id number]

## 2022-03-31 ENCOUNTER — APPOINTMENT (OUTPATIENT)
Dept: GENERAL RADIOLOGY | Facility: HOSPITAL | Age: 87
End: 2022-03-31
Attending: HOSPITALIST
Payer: MEDICARE

## 2022-03-31 PROBLEM — C34.90 METASTATIC LUNG CANCER (METASTASIS FROM LUNG TO OTHER SITE) (HCC): Status: ACTIVE | Noted: 2022-03-31

## 2022-03-31 LAB
ANION GAP SERPL CALC-SCNC: 5 MMOL/L (ref 0–18)
APTT PPP: 64.2 SECONDS (ref 23.3–35.6)
BUN BLD-MCNC: 18 MG/DL (ref 7–18)
BUN/CREAT SERPL: 16.8 (ref 10–20)
CALCIUM BLD-MCNC: 8.5 MG/DL (ref 8.5–10.1)
CHLORIDE SERPL-SCNC: 94 MMOL/L (ref 98–112)
CO2 SERPL-SCNC: 29 MMOL/L (ref 21–32)
CREAT BLD-MCNC: 1.07 MG/DL
GLUCOSE BLD-MCNC: 143 MG/DL (ref 70–99)
GLUCOSE BLDC GLUCOMTR-MCNC: 236 MG/DL (ref 70–99)
OSMOLALITY SERPL CALC.SUM OF ELEC: 270 MOSM/KG (ref 275–295)
POTASSIUM SERPL-SCNC: 4.1 MMOL/L (ref 3.5–5.1)
SODIUM SERPL-SCNC: 128 MMOL/L (ref 136–145)

## 2022-03-31 PROCEDURE — 85730 THROMBOPLASTIN TIME PARTIAL: CPT | Performed by: INTERNAL MEDICINE

## 2022-03-31 PROCEDURE — 82962 GLUCOSE BLOOD TEST: CPT

## 2022-03-31 PROCEDURE — 94640 AIRWAY INHALATION TREATMENT: CPT

## 2022-03-31 PROCEDURE — 93010 ELECTROCARDIOGRAM REPORT: CPT | Performed by: NURSE PRACTITIONER

## 2022-03-31 PROCEDURE — 97535 SELF CARE MNGMENT TRAINING: CPT

## 2022-03-31 PROCEDURE — 71045 X-RAY EXAM CHEST 1 VIEW: CPT | Performed by: HOSPITALIST

## 2022-03-31 PROCEDURE — 93010 ELECTROCARDIOGRAM REPORT: CPT | Performed by: INTERNAL MEDICINE

## 2022-03-31 PROCEDURE — 93005 ELECTROCARDIOGRAM TRACING: CPT

## 2022-03-31 PROCEDURE — 80048 BASIC METABOLIC PNL TOTAL CA: CPT | Performed by: NURSE PRACTITIONER

## 2022-03-31 RX ORDER — METHYLPREDNISOLONE SODIUM SUCCINATE 40 MG/ML
40 INJECTION, POWDER, LYOPHILIZED, FOR SOLUTION INTRAMUSCULAR; INTRAVENOUS ONCE
Status: COMPLETED | OUTPATIENT
Start: 2022-03-31 | End: 2022-03-31

## 2022-03-31 RX ORDER — AMOXICILLIN AND CLAVULANATE POTASSIUM 875; 125 MG/1; MG/1
875 TABLET, FILM COATED ORAL EVERY 12 HOURS SCHEDULED
Status: DISCONTINUED | OUTPATIENT
Start: 2022-03-31 | End: 2022-04-04

## 2022-03-31 RX ORDER — LORAZEPAM 2 MG/ML
0.25 INJECTION INTRAMUSCULAR ONCE
Status: COMPLETED | OUTPATIENT
Start: 2022-03-31 | End: 2022-03-31

## 2022-03-31 RX ORDER — MORPHINE SULFATE 2 MG/ML
INJECTION, SOLUTION INTRAMUSCULAR; INTRAVENOUS
Status: DISPENSED
Start: 2022-03-31 | End: 2022-04-01

## 2022-03-31 RX ORDER — PREDNISONE 20 MG/1
40 TABLET ORAL
Status: DISCONTINUED | OUTPATIENT
Start: 2022-03-31 | End: 2022-03-31

## 2022-03-31 RX ORDER — ALBUTEROL SULFATE 2.5 MG/3ML
10 SOLUTION RESPIRATORY (INHALATION) CONTINUOUS
Status: DISCONTINUED | OUTPATIENT
Start: 2022-03-31 | End: 2022-04-04

## 2022-03-31 RX ORDER — MORPHINE SULFATE 2 MG/ML
1 INJECTION, SOLUTION INTRAMUSCULAR; INTRAVENOUS EVERY 4 HOURS PRN
Status: DISCONTINUED | OUTPATIENT
Start: 2022-03-31 | End: 2022-04-04

## 2022-03-31 RX ORDER — MORPHINE SULFATE 2 MG/ML
1 INJECTION, SOLUTION INTRAMUSCULAR; INTRAVENOUS ONCE
Status: COMPLETED | OUTPATIENT
Start: 2022-03-31 | End: 2022-03-31

## 2022-03-31 RX ORDER — METHYLPREDNISOLONE SODIUM SUCCINATE 40 MG/ML
INJECTION, POWDER, LYOPHILIZED, FOR SOLUTION INTRAMUSCULAR; INTRAVENOUS
Status: COMPLETED
Start: 2022-03-31 | End: 2022-03-31

## 2022-03-31 RX ORDER — LORAZEPAM 2 MG/ML
INJECTION INTRAMUSCULAR
Status: DISPENSED
Start: 2022-03-31 | End: 2022-04-01

## 2022-03-31 RX ORDER — METHYLPREDNISOLONE SODIUM SUCCINATE 125 MG/2ML
60 INJECTION, POWDER, LYOPHILIZED, FOR SOLUTION INTRAMUSCULAR; INTRAVENOUS EVERY 12 HOURS
Status: DISCONTINUED | OUTPATIENT
Start: 2022-03-31 | End: 2022-04-01

## 2022-03-31 RX ORDER — HYDRALAZINE HYDROCHLORIDE 20 MG/ML
INJECTION INTRAMUSCULAR; INTRAVENOUS
Status: DISPENSED
Start: 2022-03-31 | End: 2022-04-01

## 2022-03-31 RX ORDER — HYDRALAZINE HYDROCHLORIDE 20 MG/ML
10 INJECTION INTRAMUSCULAR; INTRAVENOUS ONCE
Status: COMPLETED | OUTPATIENT
Start: 2022-03-31 | End: 2022-03-31

## 2022-03-31 NOTE — PLAN OF CARE
Pt is alert and oriented x4. Oxygen sat remains stable on 5L O2 NC. Scheduled nebs. Pt states mild improvement after Nebs and IV Solumedrol . Remote tele in place. Heparin gtt infusing. Next PTT is with morning labs. Up x2 stand and pivot to the rolling chair. PRN imodium given for diarrhea. Zosyn for IV abx. Family at bedside. Call light within reach. Fall precautions in place. Problem: Patient Centered Care  Goal: Patient preferences are identified and integrated in the patient's plan of care  Description: Interventions:  - What would you like us to know as we care for you?  I live at home with my wife  - Provide timely, complete, and accurate information to patient/family  - Incorporate patient and family knowledge, values, beliefs, and cultural backgrounds into the planning and delivery of care  - Encourage patient/family to participate in care and decision-making at the level they choose  - Honor patient and family perspectives and choices  Outcome: Progressing     Problem: Patient/Family Goals  Goal: Patient/Family Long Term Goal  Description: Patient's Long Term Goal: walk without being out of breath    Interventions:  - Pulmonology on consult  -IV antibiotics  -IVF    - See additional Care Plan goals for specific interventions  Outcome: Progressing  Goal: Patient/Family Short Term Goal  Description: Patient's Short Term Goal: catch my breath    Interventions:     - O2 supplement as needed  - IV antibiotics  - Nebulizer treatment    - See additional Care Plan goals for specific interventions  Outcome: Progressing     Problem: RESPIRATORY - ADULT  Goal: Achieves optimal ventilation and oxygenation  Description: INTERVENTIONS:  - Assess for changes in respiratory status  - Assess for changes in mentation and behavior  - Position to facilitate oxygenation and minimize respiratory effort  - Oxygen supplementation based on oxygen saturation or ABGs  - Provide Smoking Cessation handout, if applicable  - Encourage broncho-pulmonary hygiene including cough, deep breathe, Incentive Spirometry  - Assess the need for suctioning and perform as needed  - Assess and instruct to report SOB or any respiratory difficulty  - Respiratory Therapy support as indicated  - Manage/alleviate anxiety  - Monitor for signs/symptoms of CO2 retention  Outcome: Progressing     Problem: PAIN - ADULT  Goal: Verbalizes/displays adequate comfort level or patient's stated pain goal  Description: INTERVENTIONS:  - Encourage pt to monitor pain and request assistance  - Assess pain using appropriate pain scale  - Administer analgesics based on type and severity of pain and evaluate response  - Implement non-pharmacological measures as appropriate and evaluate response  - Consider cultural and social influences on pain and pain management  - Manage/alleviate anxiety  - Utilize distraction and/or relaxation techniques  - Monitor for opioid side effects  - Notify MD/LIP if interventions unsuccessful or patient reports new pain  - Anticipate increased pain with activity and pre-medicate as appropriate  Outcome: Progressing     Problem: SAFETY ADULT - FALL  Goal: Free from fall injury  Description: INTERVENTIONS:  - Assess pt frequently for physical needs  - Identify cognitive and physical deficits and behaviors that affect risk of falls.   - [place] fall precautions as indicated by assessment.  - Educate pt/family on patient safety including physical limitations  - Instruct pt to call for assistance with activity based on assessment  - Modify environment to reduce risk of injury  - Provide assistive devices as appropriate  - Consider OT/PT consult to assist with strengthening/mobility  - Encourage toileting schedule  Outcome: Progressing     Problem: DISCHARGE PLANNING  Goal: Discharge to home or other facility with appropriate resources  Description: INTERVENTIONS:  - Identify barriers to discharge w/pt and caregiver  - Include patient/family/discharge partner in discharge planning  - Arrange for needed discharge resources and transportation as appropriate  - Identify discharge learning needs (meds, wound care, etc)  - Arrange for interpreters to assist at discharge as needed  - Consider post-discharge preferences of patient/family/discharge partner  - Complete POLST form as appropriate  - Assess patient's ability to be responsible for managing their own health  - Refer to Case Management Department for coordinating discharge planning if the patient needs post-hospital services based on physician/LIP order or complex needs related to functional status, cognitive ability or social support system  Outcome: Progressing     Problem: GASTROINTESTINAL - ADULT  Goal: Maintains or returns to baseline bowel function  Description: INTERVENTIONS:  - Assess bowel function  - Maintain adequate hydration with IV or PO as ordered and tolerated  - Evaluate effectiveness of GI medications  - Encourage mobilization and activity  - Obtain nutritional consult as needed  - Establish a toileting routine/schedule  - Consider collaborating with pharmacy to review patient's medication profile  Outcome: Progressing     Problem: HEMATOLOGIC - ADULT  Goal: Free from bleeding injury  Description: (Example usage: patient with low platelets)  INTERVENTIONS:  - Avoid intramuscular injections, enemas and rectal medication administration  - Ensure safe mobilization of patient  - Hold pressure on venipuncture sites to achieve adequate hemostasis  - Assess for signs and symptoms of internal bleeding  - Monitor lab trends  - Patient is to report abnormal signs of bleeding to staff  - Avoid use of toothpicks and dental floss  - Use electric shaver for shaving  - Use soft bristle tooth brush  - Limit straining and forceful nose blowing  Outcome: Progressing     Problem: MUSCULOSKELETAL - ADULT  Goal: Return mobility to safest level of function  Description: INTERVENTIONS:  - Assess patient stability and activity tolerance for standing, transferring and ambulating w/ or w/o assistive devices  - Assist with transfers and ambulation using safe patient handling equipment as needed  - Ensure adequate protection for wounds/incisions during mobilization  - Obtain PT/OT consults as needed  - Advance activity as appropriate  - Communicate ordered activity level and limitations with patient/family  Outcome: Progressing  Goal: Maintain proper alignment of affected body part  Description: INTERVENTIONS:  - Support and protect limb and body alignment per provider's orders  - Instruct and reinforce with patient and family use of appropriate assistive device and precautions (e.g. spinal or hip dislocation precautions)  Outcome: Progressing

## 2022-03-31 NOTE — SPIRITUAL CARE NOTE
RRT called as pt. became SOB. Pt sitting up in chair receiving breathing treatment in a partially lit room. Pt is being treated for cancer of the lung.  introduced herself and SCT to wife and daughter at bedside, while offering radical hospitality which was denied. Medical team stabalized pt.   prayed outside the room and left with a blessing

## 2022-03-31 NOTE — SIGNIFICANT EVENT
RRT note. RRT called for pt SOB. Pt O2 sat 99% however pt feels extremely SOB. Lungs very tight and wheezy on exam.  Hypertensive. Give nebs, higher dose. Give stat IV solu medrol 40 mg. Start solu medrol 60 mg iv bid. Pt given higher flow O2. Pt mildly improved. Discussed with pulm. Pt with large lung mass and pt may breath better in certain positions. Discussed with pt and family. Discussed poor prognosis with family. Family agrees with low dose morphine and they would like to talk to palliative care again. Nurse with update attending. Discussed case with Dr. Marni Velez. >35 minutes critical care time.     Stephanie Warren MD  3/31/2022

## 2022-03-31 NOTE — RESPIRATORY THERAPY NOTE
1325: RESPIRATORY THERAPY RAPID RESPONSE NOTE    RRT called for respiratory distress? yes  Breathing pattern: Labored  Patient currently being seen by Respiratory Care? yes    RT interventions necessary? yes    Oxygen applied/increased? yes  Nebulizer performed? yes  ABG performed? no  ECG performed? no  BiPAP applied? no   Therapist notes: Cont neb tx started with 10 mg albuterol, placed on HF O2 /AIRVO- 60 L and fio2 60%,O2 sat 98%.

## 2022-03-31 NOTE — CM/SW NOTE
MDO-rehab (family feels they are unable to manage at home)    Tentative referrals pending in [place] for [facility], need to follow up with choice list when avail. DON PASRR Level I Completed via [employer] and attached to referral.     158pm  Met with patient, spouse, and daughter at bedside. SW inquired about rehab placement. Daughter and spouse are not in agreement with [facility] placement, state that they are planning to take patient home. SW encouraged family to reach out if they change their mind. PLAN: Declined [facility] placement- Home with [facility] (orders/f2f placed). Referral and order placed to [facility] for wheel chair and commode, pending delivery. Monitor O2. Likely will need transportation assistance home.      ABEL Kirk, [facility] [place]    [id number]

## 2022-04-01 LAB
ANION GAP SERPL CALC-SCNC: 10 MMOL/L (ref 0–18)
APTT PPP: 25.8 SECONDS (ref 23.3–35.6)
BASOPHILS # BLD AUTO: 0.01 X10(3) UL (ref 0–0.2)
BASOPHILS NFR BLD AUTO: 0.1 %
BUN BLD-MCNC: 29 MG/DL (ref 7–18)
BUN/CREAT SERPL: 24.6 (ref 10–20)
CALCIUM BLD-MCNC: 9 MG/DL (ref 8.5–10.1)
CHLORIDE SERPL-SCNC: 93 MMOL/L (ref 98–112)
CO2 SERPL-SCNC: 27 MMOL/L (ref 21–32)
CREAT BLD-MCNC: 1.18 MG/DL
DEPRECATED RDW RBC AUTO: 42.3 FL (ref 35.1–46.3)
EOSINOPHIL # BLD AUTO: 0 X10(3) UL (ref 0–0.7)
EOSINOPHIL NFR BLD AUTO: 0 %
ERYTHROCYTE [DISTWIDTH] IN BLOOD BY AUTOMATED COUNT: 12.7 % (ref 11–15)
GLUCOSE BLD-MCNC: 173 MG/DL (ref 70–99)
HCT VFR BLD AUTO: 41.5 %
HGB BLD-MCNC: 13.7 G/DL
IMM GRANULOCYTES # BLD AUTO: 0.04 X10(3) UL (ref 0–1)
IMM GRANULOCYTES NFR BLD: 0.5 %
LYMPHOCYTES # BLD AUTO: 0.49 X10(3) UL (ref 1–4)
LYMPHOCYTES NFR BLD AUTO: 6 %
MCH RBC QN AUTO: 30.2 PG (ref 26–34)
MCHC RBC AUTO-ENTMCNC: 33 G/DL (ref 31–37)
MCV RBC AUTO: 91.6 FL
MONOCYTES # BLD AUTO: 0.41 X10(3) UL (ref 0.1–1)
MONOCYTES NFR BLD AUTO: 5 %
NEUTROPHILS # BLD AUTO: 7.25 X10 (3) UL (ref 1.5–7.7)
NEUTROPHILS # BLD AUTO: 7.25 X10(3) UL (ref 1.5–7.7)
NEUTROPHILS NFR BLD AUTO: 88.4 %
OSMOLALITY SERPL CALC.SUM OF ELEC: 280 MOSM/KG (ref 275–295)
PLATELET # BLD AUTO: 182 10(3)UL (ref 150–450)
POTASSIUM SERPL-SCNC: 4.4 MMOL/L (ref 3.5–5.1)
RBC # BLD AUTO: 4.53 X10(6)UL
SODIUM SERPL-SCNC: 130 MMOL/L (ref 136–145)
WBC # BLD AUTO: 8.2 X10(3) UL (ref 4–11)

## 2022-04-01 PROCEDURE — 85025 COMPLETE CBC W/AUTO DIFF WBC: CPT | Performed by: INTERNAL MEDICINE

## 2022-04-01 PROCEDURE — 80048 BASIC METABOLIC PNL TOTAL CA: CPT | Performed by: NURSE PRACTITIONER

## 2022-04-01 PROCEDURE — 93005 ELECTROCARDIOGRAM TRACING: CPT

## 2022-04-01 PROCEDURE — 97530 THERAPEUTIC ACTIVITIES: CPT

## 2022-04-01 PROCEDURE — 94640 AIRWAY INHALATION TREATMENT: CPT

## 2022-04-01 PROCEDURE — 93010 ELECTROCARDIOGRAM REPORT: CPT | Performed by: NURSE PRACTITIONER

## 2022-04-01 PROCEDURE — 85730 THROMBOPLASTIN TIME PARTIAL: CPT | Performed by: INTERNAL MEDICINE

## 2022-04-01 PROCEDURE — 97535 SELF CARE MNGMENT TRAINING: CPT

## 2022-04-01 RX ORDER — METHYLPREDNISOLONE SODIUM SUCCINATE 40 MG/ML
40 INJECTION, POWDER, LYOPHILIZED, FOR SOLUTION INTRAMUSCULAR; INTRAVENOUS EVERY 12 HOURS
Status: DISCONTINUED | OUTPATIENT
Start: 2022-04-01 | End: 2022-04-02

## 2022-04-01 RX ORDER — HEPARIN SODIUM 5000 [USP'U]/ML
5000 INJECTION, SOLUTION INTRAVENOUS; SUBCUTANEOUS EVERY 12 HOURS SCHEDULED
Status: DISCONTINUED | OUTPATIENT
Start: 2022-04-01 | End: 2022-04-04

## 2022-04-01 RX ORDER — MELATONIN
DAILY
Status: DISCONTINUED | OUTPATIENT
Start: 2022-04-01 | End: 2022-04-04

## 2022-04-01 NOTE — DIETARY NOTE
Brief Nutrition Note    Pt screened for LOS. Pt resting in chair with son and wife present. Pt reports good appetite. Average 85% intake for the last 5 meals, last 3 meals all at 100%. Pt denies N/V, had diarrhea yesterday. Per EMR 10% weight loss x 1 year. Pt and family report pt had gained significant weight during pandemic and was intentionally losing weight and denied any significant recent weight loss. No wounds noted. Pt not currently at nutrition risk. RD to rescreen per protocol.     Ang Ibrahim MS, [address] [place] [address], [address] [place] [address]  Clinical Nutrition  [phone]

## 2022-04-01 NOTE — SIGNIFICANT EVENT
RRT    *See RRT Documentation Record*    Reason the RRT was called: Shortness of breath  Assessment of patient leading up to RRT: Patient having labored breathing and c/o shortness of breath despite SP02 of 99%.    Interventions/Testing: Chest x-ray, nebulizer treatments, IVP solumedrol, IVP morphine  Patient Outcome/Disposition: Vital signs WNL, patient resting comfortably  Family Notified: yes  Name of family notified: Patient's wife at bedside and his daughter

## 2022-04-01 NOTE — PHYSICAL THERAPY NOTE
Chart reviewed, discussed case with RN. Pt able to ambulate room distances with GONZALEZ earlier today. Just completed [address] meeting with palliative and updated to DNAR/select. Plan for palliative care to follow at d/c. Per RN, pt in bed sleeping and son requesting pt not to be disturbed.  Will f/u at a later time as appropriate, schedule permitting,    April KWESI Weston  Physical Therapy  [address]   Phone #[phone]

## 2022-04-01 NOTE — CM/SW NOTE
MDO [facility]    Referral with order sent via [facility] to [facility].  Sent Epic Secure chat message to [facility] Liaison, Luis Franco to notify of new referral.       ABEL Poole, [facility]    [id number]

## 2022-04-01 NOTE — PLAN OF CARE
Patient alert and oriented x4. Vss. On 5L HFNC, scheduled nebs. IV steroids as ordered. Denies dyspnea today. On remote tele. EKG done after sotalol. Tolerating diet, no nausea. Fluid restriction. Voiding in urinal. No complaints of pain. Up with assist to [facility]. Eucerin cream to R arm. Family at bedside. Decided to change code status to DNAR/ select. Resting in chair comfortably. Problem: Patient Centered Care  Goal: Patient preferences are identified and integrated in the patient's plan of care  Description: Interventions:  - What would you like us to know as we care for you?  I live at home with my wife  - Provide timely, complete, and accurate information to patient/family  - Incorporate patient and family knowledge, values, beliefs, and cultural backgrounds into the planning and delivery of care  - Encourage patient/family to participate in care and decision-making at the level they choose  - Honor patient and family perspectives and choices  Outcome: Progressing     Problem: Patient/Family Goals  Goal: Patient/Family Long Term Goal  Description: Patient's Long Term Goal: walk without being out of breath    Interventions:  - Pulmonology on consult  -IV antibiotics  -IVF    - See additional Care Plan goals for specific interventions  Outcome: Progressing  Goal: Patient/Family Short Term Goal  Description: Patient's Short Term Goal: catch my breath    Interventions:     - O2 supplement as needed  - IV antibiotics  - Nebulizer treatment    - See additional Care Plan goals for specific interventions  Outcome: Progressing     Problem: RESPIRATORY - ADULT  Goal: Achieves optimal ventilation and oxygenation  Description: INTERVENTIONS:  - Assess for changes in respiratory status  - Assess for changes in mentation and behavior  - Position to facilitate oxygenation and minimize respiratory effort  - Oxygen supplementation based on oxygen saturation or ABGs  - Provide Smoking Cessation handout, if applicable  - Encourage broncho-pulmonary hygiene including cough, deep breathe, Incentive Spirometry  - Assess the need for suctioning and perform as needed  - Assess and instruct to report SOB or any respiratory difficulty  - Respiratory Therapy support as indicated  - Manage/alleviate anxiety  - Monitor for signs/symptoms of CO2 retention  Outcome: Progressing     Problem: PAIN - ADULT  Goal: Verbalizes/displays adequate comfort level or patient's stated pain goal  Description: INTERVENTIONS:  - Encourage pt to monitor pain and request assistance  - Assess pain using appropriate pain scale  - Administer analgesics based on type and severity of pain and evaluate response  - Implement non-pharmacological measures as appropriate and evaluate response  - Consider cultural and social influences on pain and pain management  - Manage/alleviate anxiety  - Utilize distraction and/or relaxation techniques  - Monitor for opioid side effects  - Notify MD/LIP if interventions unsuccessful or patient reports new pain  - Anticipate increased pain with activity and pre-medicate as appropriate  Outcome: Progressing     Problem: SAFETY ADULT - FALL  Goal: Free from fall injury  Description: INTERVENTIONS:  - Assess pt frequently for physical needs  - Identify cognitive and physical deficits and behaviors that affect risk of falls.   - [place] fall precautions as indicated by assessment.  - Educate pt/family on patient safety including physical limitations  - Instruct pt to call for assistance with activity based on assessment  - Modify environment to reduce risk of injury  - Provide assistive devices as appropriate  - Consider OT/PT consult to assist with strengthening/mobility  - Encourage toileting schedule  Outcome: Progressing     Problem: DISCHARGE PLANNING  Goal: Discharge to home or other facility with appropriate resources  Description: INTERVENTIONS:  - Identify barriers to discharge w/pt and caregiver  - Include patient/family/discharge partner in discharge planning  - Arrange for needed discharge resources and transportation as appropriate  - Identify discharge learning needs (meds, wound care, etc)  - Arrange for interpreters to assist at discharge as needed  - Consider post-discharge preferences of patient/family/discharge partner  - Complete POLST form as appropriate  - Assess patient's ability to be responsible for managing their own health  - Refer to Case Management Department for coordinating discharge planning if the patient needs post-hospital services based on physician/LIP order or complex needs related to functional status, cognitive ability or social support system  Outcome: Progressing     Problem: GASTROINTESTINAL - ADULT  Goal: Maintains or returns to baseline bowel function  Description: INTERVENTIONS:  - Assess bowel function  - Maintain adequate hydration with IV or PO as ordered and tolerated  - Evaluate effectiveness of GI medications  - Encourage mobilization and activity  - Obtain nutritional consult as needed  - Establish a toileting routine/schedule  - Consider collaborating with pharmacy to review patient's medication profile  Outcome: Progressing     Problem: HEMATOLOGIC - ADULT  Goal: Free from bleeding injury  Description: (Example usage: patient with low platelets)  INTERVENTIONS:  - Avoid intramuscular injections, enemas and rectal medication administration  - Ensure safe mobilization of patient  - Hold pressure on venipuncture sites to achieve adequate hemostasis  - Assess for signs and symptoms of internal bleeding  - Monitor lab trends  - Patient is to report abnormal signs of bleeding to staff  - Avoid use of toothpicks and dental floss  - Use electric shaver for shaving  - Use soft bristle tooth brush  - Limit straining and forceful nose blowing  Outcome: Progressing     Problem: MUSCULOSKELETAL - ADULT  Goal: Return mobility to safest level of function  Description: INTERVENTIONS:  - Assess patient stability and activity tolerance for standing, transferring and ambulating w/ or w/o assistive devices  - Assist with transfers and ambulation using safe patient handling equipment as needed  - Ensure adequate protection for wounds/incisions during mobilization  - Obtain PT/OT consults as needed  - Advance activity as appropriate  - Communicate ordered activity level and limitations with patient/family  Outcome: Progressing  Goal: Maintain proper alignment of affected body part  Description: INTERVENTIONS:  - Support and protect limb and body alignment per provider's orders  - Instruct and reinforce with patient and family use of appropriate assistive device and precautions (e.g. spinal or hip dislocation precautions)  Outcome: Progressing

## 2022-04-01 NOTE — PLAN OF CARE
Problem: Patient Centered Care  Goal: Patient preferences are identified and integrated in the patient's plan of care  Description: Interventions:  - What would you like us to know as we care for you?  I live at home with my wife  - Provide timely, complete, and accurate information to patient/family  - Incorporate patient and family knowledge, values, beliefs, and cultural backgrounds into the planning and delivery of care  - Encourage patient/family to participate in care and decision-making at the level they choose  - Honor patient and family perspectives and choices  Outcome: Progressing     Problem: Patient/Family Goals  Goal: Patient/Family Long Term Goal  Description: Patient's Long Term Goal: walk without being out of breath    Interventions:  - Pulmonology on consult  -IV antibiotics  -IVF    - See additional Care Plan goals for specific interventions  Outcome: Progressing  Goal: Patient/Family Short Term Goal  Description: Patient's Short Term Goal: catch my breath    Interventions:     - O2 supplement as needed  - IV antibiotics  - Nebulizer treatment    - See additional Care Plan goals for specific interventions  Outcome: Progressing     Problem: RESPIRATORY - ADULT  Goal: Achieves optimal ventilation and oxygenation  Description: INTERVENTIONS:  - Assess for changes in respiratory status  - Assess for changes in mentation and behavior  - Position to facilitate oxygenation and minimize respiratory effort  - Oxygen supplementation based on oxygen saturation or ABGs  - Provide Smoking Cessation handout, if applicable  - Encourage broncho-pulmonary hygiene including cough, deep breathe, Incentive Spirometry  - Assess the need for suctioning and perform as needed  - Assess and instruct to report SOB or any respiratory difficulty  - Respiratory Therapy support as indicated  - Manage/alleviate anxiety  - Monitor for signs/symptoms of CO2 retention  Outcome: Progressing     Problem: PAIN - ADULT  Goal: Verbalizes/displays adequate comfort level or patient's stated pain goal  Description: INTERVENTIONS:  - Encourage pt to monitor pain and request assistance  - Assess pain using appropriate pain scale  - Administer analgesics based on type and severity of pain and evaluate response  - Implement non-pharmacological measures as appropriate and evaluate response  - Consider cultural and social influences on pain and pain management  - Manage/alleviate anxiety  - Utilize distraction and/or relaxation techniques  - Monitor for opioid side effects  - Notify MD/LIP if interventions unsuccessful or patient reports new pain  - Anticipate increased pain with activity and pre-medicate as appropriate  Outcome: Progressing     Problem: SAFETY ADULT - FALL  Goal: Free from fall injury  Description: INTERVENTIONS:  - Assess pt frequently for physical needs  - Identify cognitive and physical deficits and behaviors that affect risk of falls.   - [place] fall precautions as indicated by assessment.  - Educate pt/family on patient safety including physical limitations  - Instruct pt to call for assistance with activity based on assessment  - Modify environment to reduce risk of injury  - Provide assistive devices as appropriate  - Consider OT/PT consult to assist with strengthening/mobility  - Encourage toileting schedule  Outcome: Progressing     Problem: DISCHARGE PLANNING  Goal: Discharge to home or other facility with appropriate resources  Description: INTERVENTIONS:  - Identify barriers to discharge w/pt and caregiver  - Include patient/family/discharge partner in discharge planning  - Arrange for needed discharge resources and transportation as appropriate  - Identify discharge learning needs (meds, wound care, etc)  - Arrange for interpreters to assist at discharge as needed  - Consider post-discharge preferences of patient/family/discharge partner  - Complete POLST form as appropriate  - Assess patient's ability to be responsible for managing their own health  - Refer to Case Management Department for coordinating discharge planning if the patient needs post-hospital services based on physician/LIP order or complex needs related to functional status, cognitive ability or social support system  Outcome: Progressing     Problem: GASTROINTESTINAL - ADULT  Goal: Maintains or returns to baseline bowel function  Description: INTERVENTIONS:  - Assess bowel function  - Maintain adequate hydration with IV or PO as ordered and tolerated  - Evaluate effectiveness of GI medications  - Encourage mobilization and activity  - Obtain nutritional consult as needed  - Establish a toileting routine/schedule  - Consider collaborating with pharmacy to review patient's medication profile  Outcome: Progressing     Problem: HEMATOLOGIC - ADULT  Goal: Free from bleeding injury  Description: (Example usage: patient with low platelets)  INTERVENTIONS:  - Avoid intramuscular injections, enemas and rectal medication administration  - Ensure safe mobilization of patient  - Hold pressure on venipuncture sites to achieve adequate hemostasis  - Assess for signs and symptoms of internal bleeding  - Monitor lab trends  - Patient is to report abnormal signs of bleeding to staff  - Avoid use of toothpicks and dental floss  - Use electric shaver for shaving  - Use soft bristle tooth brush  - Limit straining and forceful nose blowing  Outcome: Progressing     Problem: MUSCULOSKELETAL - ADULT  Goal: Return mobility to safest level of function  Description: INTERVENTIONS:  - Assess patient stability and activity tolerance for standing, transferring and ambulating w/ or w/o assistive devices  - Assist with transfers and ambulation using safe patient handling equipment as needed  - Ensure adequate protection for wounds/incisions during mobilization  - Obtain PT/OT consults as needed  - Advance activity as appropriate  - Communicate ordered activity level and limitations with patient/family  Outcome: Progressing  Goal: Maintain proper alignment of affected body part  Description: INTERVENTIONS:  - Support and protect limb and body alignment per provider's orders  - Instruct and reinforce with patient and family use of appropriate assistive device and precautions (e.g. spinal or hip dislocation precautions)  Outcome: Progressing     A/O x4. 5L O2 HFNC continued. PT denies SOB. Nebs administered by RT. Remote tele in place. Voiding. BM x1. Used rolling chair to get pt to bathroom. Barrier cream applied to buttocks. Fluid restriction maintained. Pt slept in chair, stating it is easier for him to breathe. Fall precautions in place, call light within reach. Wife at bedside overnight.

## 2022-04-01 NOTE — PALLIATIVE CARE NOTE
4/4  Insurance verified. Met with patient and family bedside. Patient agreeable to PC at home, but would like hospice at home. Stating \" I want to go home to die\". Consulted with social work to obtain hospice order. Residential Liaison received referral for community palliative care. Waiting to receive insurance verification before pursing.      Nithya Otoole  Palliative Liaison  [id number]

## 2022-04-02 LAB
ANION GAP SERPL CALC-SCNC: 5 MMOL/L (ref 0–18)
BUN BLD-MCNC: 38 MG/DL (ref 7–18)
BUN/CREAT SERPL: 34.5 (ref 10–20)
CALCIUM BLD-MCNC: 8.9 MG/DL (ref 8.5–10.1)
CHLORIDE SERPL-SCNC: 94 MMOL/L (ref 98–112)
CO2 SERPL-SCNC: 33 MMOL/L (ref 21–32)
CREAT BLD-MCNC: 1.1 MG/DL
DEPRECATED RDW RBC AUTO: 44.3 FL (ref 35.1–46.3)
ERYTHROCYTE [DISTWIDTH] IN BLOOD BY AUTOMATED COUNT: 12.9 % (ref 11–15)
GLUCOSE BLD-MCNC: 142 MG/DL (ref 70–99)
HCT VFR BLD AUTO: 39.6 %
HGB BLD-MCNC: 12.9 G/DL
MCH RBC QN AUTO: 30.4 PG (ref 26–34)
MCHC RBC AUTO-ENTMCNC: 32.6 G/DL (ref 31–37)
MCV RBC AUTO: 93.2 FL
OSMOLALITY SERPL CALC.SUM OF ELEC: 285 MOSM/KG (ref 275–295)
PLATELET # BLD AUTO: 231 10(3)UL (ref 150–450)
POTASSIUM SERPL-SCNC: 4.6 MMOL/L (ref 3.5–5.1)
RBC # BLD AUTO: 4.25 X10(6)UL
SODIUM SERPL-SCNC: 132 MMOL/L (ref 136–145)
WBC # BLD AUTO: 8.4 X10(3) UL (ref 4–11)

## 2022-04-02 PROCEDURE — 93005 ELECTROCARDIOGRAM TRACING: CPT

## 2022-04-02 PROCEDURE — 94640 AIRWAY INHALATION TREATMENT: CPT

## 2022-04-02 PROCEDURE — 80048 BASIC METABOLIC PNL TOTAL CA: CPT | Performed by: INTERNAL MEDICINE

## 2022-04-02 PROCEDURE — 85027 COMPLETE CBC AUTOMATED: CPT | Performed by: NURSE PRACTITIONER

## 2022-04-02 PROCEDURE — 93010 ELECTROCARDIOGRAM REPORT: CPT | Performed by: NURSE PRACTITIONER

## 2022-04-02 RX ORDER — PREDNISONE 20 MG/1
20 TABLET ORAL
Status: DISCONTINUED | OUTPATIENT
Start: 2022-04-03 | End: 2022-04-04

## 2022-04-02 NOTE — PLAN OF CARE
Pt A&Ox4. On 5L high flow. Dyspenic with exertion, weak moist cough, scheduled Nebs. Denies pain, nausea or vomiting. IV solumedrol given, PO prednisone to start tomorrow Sotalol given and EKG ordered for 2 hours post medication administration. Up stand pivot to rolling chair. Tolerating cardiac diet and fluid restriction continued. Voiding freely. Bowel movement during shift. Cream barrier applied for excoriation to buttock and mepliex in place. Plan to discharge home Monday with palliative care and PO prednisone. Safety measures in place. Frequent rounding by staff. Problem: Patient Centered Care  Goal: Patient preferences are identified and integrated in the patient's plan of care  Description: Interventions:  - What would you like us to know as we care for you?  I live at home with my wife  - Provide timely, complete, and accurate information to patient/family  - Incorporate patient and family knowledge, values, beliefs, and cultural backgrounds into the planning and delivery of care  - Encourage patient/family to participate in care and decision-making at the level they choose  - Honor patient and family perspectives and choices  Outcome: Progressing     Problem: Patient/Family Goals  Goal: Patient/Family Long Term Goal  Description: Patient's Long Term Goal: walk without being out of breath    Interventions:  - Pulmonology on consult  -IV antibiotics  -IVF    - See additional Care Plan goals for specific interventions  Outcome: Progressing  Goal: Patient/Family Short Term Goal  Description: Patient's Short Term Goal: catch my breath    Interventions:     - O2 supplement as needed  - IV antibiotics  - Nebulizer treatment    - See additional Care Plan goals for specific interventions  Outcome: Progressing     Problem: RESPIRATORY - ADULT  Goal: Achieves optimal ventilation and oxygenation  Description: INTERVENTIONS:  - Assess for changes in respiratory status  - Assess for changes in mentation and behavior  - Position to facilitate oxygenation and minimize respiratory effort  - Oxygen supplementation based on oxygen saturation or ABGs  - Provide Smoking Cessation handout, if applicable  - Encourage broncho-pulmonary hygiene including cough, deep breathe, Incentive Spirometry  - Assess the need for suctioning and perform as needed  - Assess and instruct to report SOB or any respiratory difficulty  - Respiratory Therapy support as indicated  - Manage/alleviate anxiety  - Monitor for signs/symptoms of CO2 retention  Outcome: Progressing     Problem: PAIN - ADULT  Goal: Verbalizes/displays adequate comfort level or patient's stated pain goal  Description: INTERVENTIONS:  - Encourage pt to monitor pain and request assistance  - Assess pain using appropriate pain scale  - Administer analgesics based on type and severity of pain and evaluate response  - Implement non-pharmacological measures as appropriate and evaluate response  - Consider cultural and social influences on pain and pain management  - Manage/alleviate anxiety  - Utilize distraction and/or relaxation techniques  - Monitor for opioid side effects  - Notify MD/LIP if interventions unsuccessful or patient reports new pain  - Anticipate increased pain with activity and pre-medicate as appropriate  Outcome: Progressing     Problem: SAFETY ADULT - FALL  Goal: Free from fall injury  Description: INTERVENTIONS:  - Assess pt frequently for physical needs  - Identify cognitive and physical deficits and behaviors that affect risk of falls.   - [place] fall precautions as indicated by assessment.  - Educate pt/family on patient safety including physical limitations  - Instruct pt to call for assistance with activity based on assessment  - Modify environment to reduce risk of injury  - Provide assistive devices as appropriate  - Consider OT/PT consult to assist with strengthening/mobility  - Encourage toileting schedule  Outcome: Progressing     Problem: DISCHARGE PLANNING  Goal: Discharge to home or other facility with appropriate resources  Description: INTERVENTIONS:  - Identify barriers to discharge w/pt and caregiver  - Include patient/family/discharge partner in discharge planning  - Arrange for needed discharge resources and transportation as appropriate  - Identify discharge learning needs (meds, wound care, etc)  - Arrange for interpreters to assist at discharge as needed  - Consider post-discharge preferences of patient/family/discharge partner  - Complete POLST form as appropriate  - Assess patient's ability to be responsible for managing their own health  - Refer to Case Management Department for coordinating discharge planning if the patient needs post-hospital services based on physician/LIP order or complex needs related to functional status, cognitive ability or social support system  Outcome: Progressing     Problem: GASTROINTESTINAL - ADULT  Goal: Maintains or returns to baseline bowel function  Description: INTERVENTIONS:  - Assess bowel function  - Maintain adequate hydration with IV or PO as ordered and tolerated  - Evaluate effectiveness of GI medications  - Encourage mobilization and activity  - Obtain nutritional consult as needed  - Establish a toileting routine/schedule  - Consider collaborating with pharmacy to review patient's medication profile  Outcome: Progressing     Problem: HEMATOLOGIC - ADULT  Goal: Free from bleeding injury  Description: (Example usage: patient with low platelets)  INTERVENTIONS:  - Avoid intramuscular injections, enemas and rectal medication administration  - Ensure safe mobilization of patient  - Hold pressure on venipuncture sites to achieve adequate hemostasis  - Assess for signs and symptoms of internal bleeding  - Monitor lab trends  - Patient is to report abnormal signs of bleeding to staff  - Avoid use of toothpicks and dental floss  - Use electric shaver for shaving  - Use soft bristle tooth brush  - Limit straining and forceful nose blowing  Outcome: Progressing     Problem: MUSCULOSKELETAL - ADULT  Goal: Return mobility to safest level of function  Description: INTERVENTIONS:  - Assess patient stability and activity tolerance for standing, transferring and ambulating w/ or w/o assistive devices  - Assist with transfers and ambulation using safe patient handling equipment as needed  - Ensure adequate protection for wounds/incisions during mobilization  - Obtain PT/OT consults as needed  - Advance activity as appropriate  - Communicate ordered activity level and limitations with patient/family  Outcome: Progressing  Goal: Maintain proper alignment of affected body part  Description: INTERVENTIONS:  - Support and protect limb and body alignment per provider's orders  - Instruct and reinforce with patient and family use of appropriate assistive device and precautions (e.g. spinal or hip dislocation precautions)  Outcome: Progressing

## 2022-04-02 NOTE — PLAN OF CARE
Problem: Patient Centered Care  Goal: Patient preferences are identified and integrated in the patient's plan of care  Description: Interventions:  - What would you like us to know as we care for you?  I live at home with my wife  - Provide timely, complete, and accurate information to patient/family  - Incorporate patient and family knowledge, values, beliefs, and cultural backgrounds into the planning and delivery of care  - Encourage patient/family to participate in care and decision-making at the level they choose  - Honor patient and family perspectives and choices  Outcome: Progressing     Problem: Patient/Family Goals  Goal: Patient/Family Long Term Goal  Description: Patient's Long Term Goal: walk without being out of breath    Interventions:  - Pulmonology on consult  -IV antibiotics  -IVF    - See additional Care Plan goals for specific interventions  Outcome: Progressing  Goal: Patient/Family Short Term Goal  Description: Patient's Short Term Goal: catch my breath    Interventions:     - O2 supplement as needed  - IV antibiotics  - Nebulizer treatment    - See additional Care Plan goals for specific interventions  Outcome: Progressing     Problem: RESPIRATORY - ADULT  Goal: Achieves optimal ventilation and oxygenation  Description: INTERVENTIONS:  - Assess for changes in respiratory status  - Assess for changes in mentation and behavior  - Position to facilitate oxygenation and minimize respiratory effort  - Oxygen supplementation based on oxygen saturation or ABGs  - Provide Smoking Cessation handout, if applicable  - Encourage broncho-pulmonary hygiene including cough, deep breathe, Incentive Spirometry  - Assess the need for suctioning and perform as needed  - Assess and instruct to report SOB or any respiratory difficulty  - Respiratory Therapy support as indicated  - Manage/alleviate anxiety  - Monitor for signs/symptoms of CO2 retention  Outcome: Progressing     Problem: PAIN - ADULT  Goal: Verbalizes/displays adequate comfort level or patient's stated pain goal  Description: INTERVENTIONS:  - Encourage pt to monitor pain and request assistance  - Assess pain using appropriate pain scale  - Administer analgesics based on type and severity of pain and evaluate response  - Implement non-pharmacological measures as appropriate and evaluate response  - Consider cultural and social influences on pain and pain management  - Manage/alleviate anxiety  - Utilize distraction and/or relaxation techniques  - Monitor for opioid side effects  - Notify MD/LIP if interventions unsuccessful or patient reports new pain  - Anticipate increased pain with activity and pre-medicate as appropriate  Outcome: Progressing     Problem: SAFETY ADULT - FALL  Goal: Free from fall injury  Description: INTERVENTIONS:  - Assess pt frequently for physical needs  - Identify cognitive and physical deficits and behaviors that affect risk of falls.   - [place] fall precautions as indicated by assessment.  - Educate pt/family on patient safety including physical limitations  - Instruct pt to call for assistance with activity based on assessment  - Modify environment to reduce risk of injury  - Provide assistive devices as appropriate  - Consider OT/PT consult to assist with strengthening/mobility  - Encourage toileting schedule  Outcome: Progressing     Problem: DISCHARGE PLANNING  Goal: Discharge to home or other facility with appropriate resources  Description: INTERVENTIONS:  - Identify barriers to discharge w/pt and caregiver  - Include patient/family/discharge partner in discharge planning  - Arrange for needed discharge resources and transportation as appropriate  - Identify discharge learning needs (meds, wound care, etc)  - Arrange for interpreters to assist at discharge as needed  - Consider post-discharge preferences of patient/family/discharge partner  - Complete POLST form as appropriate  - Assess patient's ability to be responsible for managing their own health  - Refer to Case Management Department for coordinating discharge planning if the patient needs post-hospital services based on physician/LIP order or complex needs related to functional status, cognitive ability or social support system  Outcome: Progressing     Problem: GASTROINTESTINAL - ADULT  Goal: Maintains or returns to baseline bowel function  Description: INTERVENTIONS:  - Assess bowel function  - Maintain adequate hydration with IV or PO as ordered and tolerated  - Evaluate effectiveness of GI medications  - Encourage mobilization and activity  - Obtain nutritional consult as needed  - Establish a toileting routine/schedule  - Consider collaborating with pharmacy to review patient's medication profile  Outcome: Progressing     Problem: HEMATOLOGIC - ADULT  Goal: Free from bleeding injury  Description: (Example usage: patient with low platelets)  INTERVENTIONS:  - Avoid intramuscular injections, enemas and rectal medication administration  - Ensure safe mobilization of patient  - Hold pressure on venipuncture sites to achieve adequate hemostasis  - Assess for signs and symptoms of internal bleeding  - Monitor lab trends  - Patient is to report abnormal signs of bleeding to staff  - Avoid use of toothpicks and dental floss  - Use electric shaver for shaving  - Use soft bristle tooth brush  - Limit straining and forceful nose blowing  Outcome: Progressing     Problem: MUSCULOSKELETAL - ADULT  Goal: Return mobility to safest level of function  Description: INTERVENTIONS:  - Assess patient stability and activity tolerance for standing, transferring and ambulating w/ or w/o assistive devices  - Assist with transfers and ambulation using safe patient handling equipment as needed  - Ensure adequate protection for wounds/incisions during mobilization  - Obtain PT/OT consults as needed  - Advance activity as appropriate  - Communicate ordered activity level and limitations with patient/family  Outcome: Progressing  Goal: Maintain proper alignment of affected body part  Description: INTERVENTIONS:  - Support and protect limb and body alignment per provider's orders  - Instruct and reinforce with patient and family use of appropriate assistive device and precautions (e.g. spinal or hip dislocation precautions)  Outcome: Progressing     A/O x4. 5L O2 HFNC continued. PT denies SOB. Nebs administered by RT. Remote tele in place. Voiding. BM x1. Used rolling chair to get pt to bathroom. Barrier cream applied to buttocks. Aquacel foam applied. Fluid restriction maintained. Pt slept in chair, stating it is easier for him to breathe. Fall precautions in place, call light within reach. Wife at bedside overnight. Elevated BP this AM, PRN hydralazine given. Endorsed to day shift RN.

## 2022-04-03 LAB
ANION GAP SERPL CALC-SCNC: 5 MMOL/L (ref 0–18)
BASOPHILS # BLD AUTO: 0.01 X10(3) UL (ref 0–0.2)
BASOPHILS NFR BLD AUTO: 0.1 %
BUN BLD-MCNC: 41 MG/DL (ref 7–18)
BUN/CREAT SERPL: 34.2 (ref 10–20)
CALCIUM BLD-MCNC: 9.3 MG/DL (ref 8.5–10.1)
CHLORIDE SERPL-SCNC: 96 MMOL/L (ref 98–112)
CO2 SERPL-SCNC: 32 MMOL/L (ref 21–32)
CREAT BLD-MCNC: 1.2 MG/DL
DEPRECATED RDW RBC AUTO: 43.4 FL (ref 35.1–46.3)
EOSINOPHIL # BLD AUTO: 0 X10(3) UL (ref 0–0.7)
EOSINOPHIL NFR BLD AUTO: 0 %
ERYTHROCYTE [DISTWIDTH] IN BLOOD BY AUTOMATED COUNT: 12.9 % (ref 11–15)
GLUCOSE BLD-MCNC: 110 MG/DL (ref 70–99)
HCT VFR BLD AUTO: 41.5 %
HGB BLD-MCNC: 13.8 G/DL
IMM GRANULOCYTES # BLD AUTO: 0.06 X10(3) UL (ref 0–1)
IMM GRANULOCYTES NFR BLD: 0.6 %
LYMPHOCYTES # BLD AUTO: 0.84 X10(3) UL (ref 1–4)
LYMPHOCYTES NFR BLD AUTO: 7.9 %
MCH RBC QN AUTO: 30.5 PG (ref 26–34)
MCHC RBC AUTO-ENTMCNC: 33.3 G/DL (ref 31–37)
MCV RBC AUTO: 91.8 FL
MONOCYTES # BLD AUTO: 1.46 X10(3) UL (ref 0.1–1)
MONOCYTES NFR BLD AUTO: 13.8 %
NEUTROPHILS # BLD AUTO: 8.2 X10 (3) UL (ref 1.5–7.7)
NEUTROPHILS # BLD AUTO: 8.2 X10(3) UL (ref 1.5–7.7)
NEUTROPHILS NFR BLD AUTO: 77.6 %
OSMOLALITY SERPL CALC.SUM OF ELEC: 287 MOSM/KG (ref 275–295)
PLATELET # BLD AUTO: 270 10(3)UL (ref 150–450)
POTASSIUM SERPL-SCNC: 4.2 MMOL/L (ref 3.5–5.1)
RBC # BLD AUTO: 4.52 X10(6)UL
SODIUM SERPL-SCNC: 133 MMOL/L (ref 136–145)
WBC # BLD AUTO: 10.6 X10(3) UL (ref 4–11)

## 2022-04-03 PROCEDURE — 94640 AIRWAY INHALATION TREATMENT: CPT

## 2022-04-03 PROCEDURE — 93010 ELECTROCARDIOGRAM REPORT: CPT | Performed by: INTERNAL MEDICINE

## 2022-04-03 PROCEDURE — 85025 COMPLETE CBC W/AUTO DIFF WBC: CPT | Performed by: INTERNAL MEDICINE

## 2022-04-03 PROCEDURE — 80048 BASIC METABOLIC PNL TOTAL CA: CPT | Performed by: INTERNAL MEDICINE

## 2022-04-03 PROCEDURE — 93005 ELECTROCARDIOGRAM TRACING: CPT

## 2022-04-03 NOTE — PLAN OF CARE
Problem: Patient Centered Care  Goal: Patient preferences are identified and integrated in the patient's plan of care  Description: Interventions:  - What would you like us to know as we care for you?  I live at home with my wife  - Provide timely, complete, and accurate information to patient/family  - Incorporate patient and family knowledge, values, beliefs, and cultural backgrounds into the planning and delivery of care  - Encourage patient/family to participate in care and decision-making at the level they choose  - Honor patient and family perspectives and choices  Outcome: Progressing     Problem: Patient/Family Goals  Goal: Patient/Family Long Term Goal  Description: Patient's Long Term Goal: walk without being out of breath    Interventions:  - Pulmonology on consult  -IV antibiotics  -IVF    - See additional Care Plan goals for specific interventions  Outcome: Progressing  Goal: Patient/Family Short Term Goal  Description: Patient's Short Term Goal: catch my breath    Interventions:     - O2 supplement as needed  - IV antibiotics  - Nebulizer treatment    - See additional Care Plan goals for specific interventions  Outcome: Progressing     Problem: RESPIRATORY - ADULT  Goal: Achieves optimal ventilation and oxygenation  Description: INTERVENTIONS:  - Assess for changes in respiratory status  - Assess for changes in mentation and behavior  - Position to facilitate oxygenation and minimize respiratory effort  - Oxygen supplementation based on oxygen saturation or ABGs  - Provide Smoking Cessation handout, if applicable  - Encourage broncho-pulmonary hygiene including cough, deep breathe, Incentive Spirometry  - Assess the need for suctioning and perform as needed  - Assess and instruct to report SOB or any respiratory difficulty  - Respiratory Therapy support as indicated  - Manage/alleviate anxiety  - Monitor for signs/symptoms of CO2 retention  Outcome: Progressing     Problem: PAIN - ADULT  Goal: Verbalizes/displays adequate comfort level or patient's stated pain goal  Description: INTERVENTIONS:  - Encourage pt to monitor pain and request assistance  - Assess pain using appropriate pain scale  - Administer analgesics based on type and severity of pain and evaluate response  - Implement non-pharmacological measures as appropriate and evaluate response  - Consider cultural and social influences on pain and pain management  - Manage/alleviate anxiety  - Utilize distraction and/or relaxation techniques  - Monitor for opioid side effects  - Notify MD/LIP if interventions unsuccessful or patient reports new pain  - Anticipate increased pain with activity and pre-medicate as appropriate  Outcome: Progressing     Problem: SAFETY ADULT - FALL  Goal: Free from fall injury  Description: INTERVENTIONS:  - Assess pt frequently for physical needs  - Identify cognitive and physical deficits and behaviors that affect risk of falls.   - [place] fall precautions as indicated by assessment.  - Educate pt/family on patient safety including physical limitations  - Instruct pt to call for assistance with activity based on assessment  - Modify environment to reduce risk of injury  - Provide assistive devices as appropriate  - Consider OT/PT consult to assist with strengthening/mobility  - Encourage toileting schedule  Outcome: Progressing     Problem: DISCHARGE PLANNING  Goal: Discharge to home or other facility with appropriate resources  Description: INTERVENTIONS:  - Identify barriers to discharge w/pt and caregiver  - Include patient/family/discharge partner in discharge planning  - Arrange for needed discharge resources and transportation as appropriate  - Identify discharge learning needs (meds, wound care, etc)  - Arrange for interpreters to assist at discharge as needed  - Consider post-discharge preferences of patient/family/discharge partner  - Complete POLST form as appropriate  - Assess patient's ability to be responsible for managing their own health  - Refer to Case Management Department for coordinating discharge planning if the patient needs post-hospital services based on physician/LIP order or complex needs related to functional status, cognitive ability or social support system  Outcome: Progressing     Problem: GASTROINTESTINAL - ADULT  Goal: Maintains or returns to baseline bowel function  Description: INTERVENTIONS:  - Assess bowel function  - Maintain adequate hydration with IV or PO as ordered and tolerated  - Evaluate effectiveness of GI medications  - Encourage mobilization and activity  - Obtain nutritional consult as needed  - Establish a toileting routine/schedule  - Consider collaborating with pharmacy to review patient's medication profile  Outcome: Progressing     Problem: HEMATOLOGIC - ADULT  Goal: Free from bleeding injury  Description: (Example usage: patient with low platelets)  INTERVENTIONS:  - Avoid intramuscular injections, enemas and rectal medication administration  - Ensure safe mobilization of patient  - Hold pressure on venipuncture sites to achieve adequate hemostasis  - Assess for signs and symptoms of internal bleeding  - Monitor lab trends  - Patient is to report abnormal signs of bleeding to staff  - Avoid use of toothpicks and dental floss  - Use electric shaver for shaving  - Use soft bristle tooth brush  - Limit straining and forceful nose blowing  Outcome: Progressing     Problem: MUSCULOSKELETAL - ADULT  Goal: Return mobility to safest level of function  Description: INTERVENTIONS:  - Assess patient stability and activity tolerance for standing, transferring and ambulating w/ or w/o assistive devices  - Assist with transfers and ambulation using safe patient handling equipment as needed  - Ensure adequate protection for wounds/incisions during mobilization  - Obtain PT/OT consults as needed  - Advance activity as appropriate  - Communicate ordered activity level and limitations with patient/family  Outcome: Progressing  Goal: Maintain proper alignment of affected body part  Description: INTERVENTIONS:  - Support and protect limb and body alignment per provider's orders  - Instruct and reinforce with patient and family use of appropriate assistive device and precautions (e.g. spinal or hip dislocation precautions)  Outcome: Progressing     Pt resting in chair throughout night. Discomfort to  Buttocks. Encouraged pt to reposition. Declined cream in AM. Hydralazine prn for elevated B/P. Remote tele continued. 4.5L O2 in use. Denies SOB while resting in chair. Moist, non productive cough. Per pt report bruise and swelling to right arm improving. Voiding per urinal. Visitors at bedside throughout night. Safety measures in place. Frequent rounding being done. Large BM in AM. Gutierrez placed.  Up to bathroom and bed with rolling chair in AM.

## 2022-04-03 NOTE — PLAN OF CARE
Pt [id number]. On 5L High flow NC. Weak moist cough, Scheduled nebs continued. Prn oral suctioning. Denies pain, nausea or vomiting. PO steroid started. Sotalol given and EKG ordered for 2 hours post med administration. Stand and pivot to rolling chair. Gutierrez catheter intact and draining to gravity. BCarrier cream applied to excoriated buttock. Tolerating cardiac diet and fluid restriction continued. Plan to discharge home tomorrow with palliative care. Safety measures in place. Pt calls appropriately. Problem: Patient Centered Care  Goal: Patient preferences are identified and integrated in the patient's plan of care  Description: Interventions:  - What would you like us to know as we care for you?  I live at home with my wife  - Provide timely, complete, and accurate information to patient/family  - Incorporate patient and family knowledge, values, beliefs, and cultural backgrounds into the planning and delivery of care  - Encourage patient/family to participate in care and decision-making at the level they choose  - Honor patient and family perspectives and choices  Outcome: Progressing     Problem: Patient/Family Goals  Goal: Patient/Family Long Term Goal  Description: Patient's Long Term Goal: walk without being out of breath    Interventions:  - Pulmonology on consult  -IV antibiotics  -IVF    - See additional Care Plan goals for specific interventions  Outcome: Progressing  Goal: Patient/Family Short Term Goal  Description: Patient's Short Term Goal: catch my breath    Interventions:     - O2 supplement as needed  - IV antibiotics  - Nebulizer treatment    - See additional Care Plan goals for specific interventions  Outcome: Progressing     Problem: RESPIRATORY - ADULT  Goal: Achieves optimal ventilation and oxygenation  Description: INTERVENTIONS:  - Assess for changes in respiratory status  - Assess for changes in mentation and behavior  - Position to facilitate oxygenation and minimize respiratory effort  - Oxygen supplementation based on oxygen saturation or ABGs  - Provide Smoking Cessation handout, if applicable  - Encourage broncho-pulmonary hygiene including cough, deep breathe, Incentive Spirometry  - Assess the need for suctioning and perform as needed  - Assess and instruct to report SOB or any respiratory difficulty  - Respiratory Therapy support as indicated  - Manage/alleviate anxiety  - Monitor for signs/symptoms of CO2 retention  Outcome: Progressing     Problem: PAIN - ADULT  Goal: Verbalizes/displays adequate comfort level or patient's stated pain goal  Description: INTERVENTIONS:  - Encourage pt to monitor pain and request assistance  - Assess pain using appropriate pain scale  - Administer analgesics based on type and severity of pain and evaluate response  - Implement non-pharmacological measures as appropriate and evaluate response  - Consider cultural and social influences on pain and pain management  - Manage/alleviate anxiety  - Utilize distraction and/or relaxation techniques  - Monitor for opioid side effects  - Notify MD/LIP if interventions unsuccessful or patient reports new pain  - Anticipate increased pain with activity and pre-medicate as appropriate  Outcome: Progressing     Problem: SAFETY ADULT - FALL  Goal: Free from fall injury  Description: INTERVENTIONS:  - Assess pt frequently for physical needs  - Identify cognitive and physical deficits and behaviors that affect risk of falls.   - [place] fall precautions as indicated by assessment.  - Educate pt/family on patient safety including physical limitations  - Instruct pt to call for assistance with activity based on assessment  - Modify environment to reduce risk of injury  - Provide assistive devices as appropriate  - Consider OT/PT consult to assist with strengthening/mobility  - Encourage toileting schedule  Outcome: Progressing     Problem: DISCHARGE PLANNING  Goal: Discharge to home or other facility with appropriate resources  Description: INTERVENTIONS:  - Identify barriers to discharge w/pt and caregiver  - Include patient/family/discharge partner in discharge planning  - Arrange for needed discharge resources and transportation as appropriate  - Identify discharge learning needs (meds, wound care, etc)  - Arrange for interpreters to assist at discharge as needed  - Consider post-discharge preferences of patient/family/discharge partner  - Complete POLST form as appropriate  - Assess patient's ability to be responsible for managing their own health  - Refer to Case Management Department for coordinating discharge planning if the patient needs post-hospital services based on physician/LIP order or complex needs related to functional status, cognitive ability or social support system  Outcome: Progressing     Problem: GASTROINTESTINAL - ADULT  Goal: Maintains or returns to baseline bowel function  Description: INTERVENTIONS:  - Assess bowel function  - Maintain adequate hydration with IV or PO as ordered and tolerated  - Evaluate effectiveness of GI medications  - Encourage mobilization and activity  - Obtain nutritional consult as needed  - Establish a toileting routine/schedule  - Consider collaborating with pharmacy to review patient's medication profile  Outcome: Progressing     Problem: HEMATOLOGIC - ADULT  Goal: Free from bleeding injury  Description: (Example usage: patient with low platelets)  INTERVENTIONS:  - Avoid intramuscular injections, enemas and rectal medication administration  - Ensure safe mobilization of patient  - Hold pressure on venipuncture sites to achieve adequate hemostasis  - Assess for signs and symptoms of internal bleeding  - Monitor lab trends  - Patient is to report abnormal signs of bleeding to staff  - Avoid use of toothpicks and dental floss  - Use electric shaver for shaving  - Use soft bristle tooth brush  - Limit straining and forceful nose blowing  Outcome: Progressing     Problem: MUSCULOSKELETAL - ADULT  Goal: Return mobility to safest level of function  Description: INTERVENTIONS:  - Assess patient stability and activity tolerance for standing, transferring and ambulating w/ or w/o assistive devices  - Assist with transfers and ambulation using safe patient handling equipment as needed  - Ensure adequate protection for wounds/incisions during mobilization  - Obtain PT/OT consults as needed  - Advance activity as appropriate  - Communicate ordered activity level and limitations with patient/family  Outcome: Progressing  Goal: Maintain proper alignment of affected body part  Description: INTERVENTIONS:  - Support and protect limb and body alignment per provider's orders  - Instruct and reinforce with patient and family use of appropriate assistive device and precautions (e.g. spinal or hip dislocation precautions)  Outcome: Progressing

## 2022-04-04 ENCOUNTER — TELEPHONE (OUTPATIENT)
Dept: INTERNAL MEDICINE CLINIC | Facility: CLINIC | Age: 87
End: 2022-04-04

## 2022-04-04 VITALS
HEIGHT: 70 IN | HEART RATE: 70 BPM | OXYGEN SATURATION: 99 % | SYSTOLIC BLOOD PRESSURE: 111 MMHG | RESPIRATION RATE: 22 BRPM | BODY MASS INDEX: 30.78 KG/M2 | WEIGHT: 215 LBS | DIASTOLIC BLOOD PRESSURE: 98 MMHG | TEMPERATURE: 98 F

## 2022-04-04 LAB
ANION GAP SERPL CALC-SCNC: 6 MMOL/L (ref 0–18)
BASOPHILS # BLD AUTO: 0.01 X10(3) UL (ref 0–0.2)
BASOPHILS NFR BLD AUTO: 0.1 %
BUN BLD-MCNC: 34 MG/DL (ref 7–18)
BUN/CREAT SERPL: 32.7 (ref 10–20)
CALCIUM BLD-MCNC: 8.8 MG/DL (ref 8.5–10.1)
CHLORIDE SERPL-SCNC: 98 MMOL/L (ref 98–112)
CO2 SERPL-SCNC: 31 MMOL/L (ref 21–32)
CREAT BLD-MCNC: 1.04 MG/DL
DEPRECATED RDW RBC AUTO: 42 FL (ref 35.1–46.3)
EOSINOPHIL # BLD AUTO: 0.03 X10(3) UL (ref 0–0.7)
EOSINOPHIL NFR BLD AUTO: 0.3 %
ERYTHROCYTE [DISTWIDTH] IN BLOOD BY AUTOMATED COUNT: 12.8 % (ref 11–15)
GLUCOSE BLD-MCNC: 106 MG/DL (ref 70–99)
HCT VFR BLD AUTO: 38.9 %
HGB BLD-MCNC: 13.2 G/DL
IMM GRANULOCYTES # BLD AUTO: 0.06 X10(3) UL (ref 0–1)
IMM GRANULOCYTES NFR BLD: 0.7 %
LYMPHOCYTES # BLD AUTO: 0.93 X10(3) UL (ref 1–4)
LYMPHOCYTES NFR BLD AUTO: 10.5 %
MCH RBC QN AUTO: 30.9 PG (ref 26–34)
MCHC RBC AUTO-ENTMCNC: 33.9 G/DL (ref 31–37)
MCV RBC AUTO: 91.1 FL
MONOCYTES # BLD AUTO: 1.26 X10(3) UL (ref 0.1–1)
MONOCYTES NFR BLD AUTO: 14.3 %
NEUTROPHILS # BLD AUTO: 6.55 X10 (3) UL (ref 1.5–7.7)
NEUTROPHILS # BLD AUTO: 6.55 X10(3) UL (ref 1.5–7.7)
NEUTROPHILS NFR BLD AUTO: 74.1 %
OSMOLALITY SERPL CALC.SUM OF ELEC: 288 MOSM/KG (ref 275–295)
PLATELET # BLD AUTO: 214 10(3)UL (ref 150–450)
POTASSIUM SERPL-SCNC: 4.2 MMOL/L (ref 3.5–5.1)
RBC # BLD AUTO: 4.27 X10(6)UL
SODIUM SERPL-SCNC: 135 MMOL/L (ref 136–145)
WBC # BLD AUTO: 8.8 X10(3) UL (ref 4–11)

## 2022-04-04 PROCEDURE — 97110 THERAPEUTIC EXERCISES: CPT

## 2022-04-04 PROCEDURE — 80048 BASIC METABOLIC PNL TOTAL CA: CPT | Performed by: INTERNAL MEDICINE

## 2022-04-04 PROCEDURE — 97530 THERAPEUTIC ACTIVITIES: CPT

## 2022-04-04 PROCEDURE — 94640 AIRWAY INHALATION TREATMENT: CPT

## 2022-04-04 PROCEDURE — 97116 GAIT TRAINING THERAPY: CPT

## 2022-04-04 PROCEDURE — 85025 COMPLETE CBC W/AUTO DIFF WBC: CPT | Performed by: INTERNAL MEDICINE

## 2022-04-04 RX ORDER — MORPHINE SULFATE 100 MG/5ML
10 SOLUTION ORAL EVERY 2 HOUR PRN
Qty: 120 ML | Refills: 0 | Status: SHIPPED | OUTPATIENT
Start: 2022-04-04

## 2022-04-04 RX ORDER — IPRATROPIUM BROMIDE AND ALBUTEROL SULFATE 2.5; .5 MG/3ML; MG/3ML
3 SOLUTION RESPIRATORY (INHALATION)
Qty: 360 ML | Refills: 2 | Status: SHIPPED | OUTPATIENT
Start: 2022-04-04

## 2022-04-04 RX ORDER — SOTALOL HYDROCHLORIDE 80 MG/1
80 TABLET ORAL EVERY 12 HOURS SCHEDULED
Qty: 60 TABLET | Refills: 3 | Status: SHIPPED | OUTPATIENT
Start: 2022-04-04

## 2022-04-04 RX ORDER — AMOXICILLIN AND CLAVULANATE POTASSIUM 875; 125 MG/1; MG/1
875 TABLET, FILM COATED ORAL EVERY 12 HOURS SCHEDULED
Qty: 14 TABLET | Refills: 0 | Status: SHIPPED | OUTPATIENT
Start: 2022-04-04

## 2022-04-04 RX ORDER — MELATONIN
Qty: 120 G | Refills: 1 | Status: SHIPPED | OUTPATIENT
Start: 2022-04-04

## 2022-04-04 RX ORDER — LOPERAMIDE HYDROCHLORIDE 2 MG/1
2 CAPSULE ORAL 4 TIMES DAILY PRN
Qty: 60 CAPSULE | Refills: 0 | Status: SHIPPED | OUTPATIENT
Start: 2022-04-04

## 2022-04-04 RX ORDER — VANCOMYCIN HYDROCHLORIDE 125 MG/1
125 CAPSULE ORAL DAILY
Qty: 7 CAPSULE | Refills: 0 | Status: SHIPPED | OUTPATIENT
Start: 2022-04-04

## 2022-04-04 RX ORDER — ACETAMINOPHEN 325 MG/1
650 TABLET ORAL EVERY 6 HOURS PRN
Qty: 60 TABLET | Refills: 0 | Status: SHIPPED | OUTPATIENT
Start: 2022-04-04

## 2022-04-04 RX ORDER — IPRATROPIUM BROMIDE AND ALBUTEROL SULFATE 2.5; .5 MG/3ML; MG/3ML
3 SOLUTION RESPIRATORY (INHALATION)
Qty: 360 ML | Refills: 2 | Status: SHIPPED | OUTPATIENT
Start: 2022-04-04 | End: 2022-04-04

## 2022-04-04 RX ORDER — PREDNISONE 10 MG/1
TABLET ORAL
Qty: 25 TABLET | Refills: 0 | Status: SHIPPED | OUTPATIENT
Start: 2022-04-04

## 2022-04-04 NOTE — CM/SW NOTE
Documentation for O2 sats: (RN to add to progress note)  Patient's O2 sat on room air is ____% at rest. Pt's O2 sat on room air is ____% when ambulating, and ____% on ____ liter while ambulating. (Reminder: The \"at rest\" and \"while ambulating\" are required statements. If patient is non ambulatory you can use \"with exertion\" such as during a transfer to assess their O2 level)        ISAIAH called and notified Carmina Arana [facility] of referral for O2 and nebulizer, will need walk test and final orders. 927am   Confirmed with [facility] that patient is accepted. 1132pm   Received notice from Palliative Care liaison, Eder Claudio that patient is requesting home hospice care. SW sent Epic secure chat message to MD to request hospice consult. 1200pm  [facility] spoke with [facility] RN Herlinda Goodrich to notify of referral.     124pm  Walk test completed. Orders placed for both O2 and nebs. Per Carmina Arana from [address] [place] [address], will need order to say 4L as concentrators do not go over 4L. Order placed, need MD signature. Sent message to MD via [employer]Scale requesting order to be signed. 127pm  Signed order sent to [facility]. Awaiting delivery of tank and neb. 336pm  Notified [address] of DC. Spoke with Lance Solares who states she is in patients room now delivering supplies. 409pm  SW met with family at bedside, patient, son, and wife present. Son expressed frustration that [facility] rep told him that commode would not be delivered until tomorrow. Also expressed concern about the oxygen concentrator being set up prior to patient DC. ISAIAH called and spoke with Cedrick Quan, requested [facility] Ar[facility] to see if there was any way the commode can be delivered tonight. Per Cedrick Quan has been reaching out to patient and family for several days to set up delivery time with no response. Carmina Arana to look into commode issue. Carmina Arana confirmed oxygen concentrator will be delivered to the home tonight. Daughter is at home to accept delivery. PLAN: meet with hospice on Wednesday at home. Home with [facility] and palliative care (orders/f2f placed). Referral and order placed to E for wheel chair and commode, pending delivery. Referral also pending to E for O2 and Nebulizer, walk test complete, orders placed, orders signed. [facility] set for 530pm to home adddress. PCS DONE. If ride needs to be rescheduled, Shar Haskins [phone].     ABEL Summers, [facility]    [id number]

## 2022-04-04 NOTE — PHYSICAL THERAPY NOTE
PHYSICAL THERAPY TREATMENT NOTE - INPATIENT     Room Number: 458/458-A       Presenting Problem: hypoxia, PNA, new lung and liver mass, suspect lung CA, biopsy pending    Problem List  Principal Problem:    Metastatic lung cancer (metastasis from lung to other site) [facility])  Active Problems:    Essential hypertension    Hypoxia    Pneumonia of right upper lobe due to infectious organism    Lung mass    Atrial fibrillation (HCC)    Hyponatremia    Neuroendocrine carcinoma of lung (HCC)      PHYSICAL THERAPY ASSESSMENT   Chart reviewed. RN  approved participation in physical therapy. PPE worn by therapist: mask, gloves and goggles. Patient was wearing a mask during session. Patient presented in bed with 6/10 pain. Patient with good  progress towards goals during this session. Education provided on Physical therapy plan of care and physiological benefits of out of bed mobility. Patient with good carryover. Bed mobility: Min assist  Transfers: Min assist  Gait Assistance: Minimum assistance  Distance (ft): 2x20  Assistive Device: Rolling walker  Pattern: Shuffle          Pt seen daily . Min a for bed mobility and transfer. .EOB sitting balance activity  With emphasis on core stabilization. Family present;family education provided;all questions and concerns addressed. Assisted pt to the bathroom. Pt amb 2 x 20 ft with RW and min a. Ther ex. . Patient was left in bedside chair at end of session with all needs in reach. The patient's Approx Degree of Impairment: 54.16% has been calculated based on documentation in the [facility] '6 clicks' Inpatient Basic Mobility Short Form. Research supports that patients with this level of impairment may benefit from 24 hour care/supervision. RN aware of patient status post session. DISCHARGE RECOMMENDATIONS  PT Discharge Recommendations: 24 hour care/supervision     PLAN  PT Treatment Plan: Bed mobility; Endurance;Gait training    SUBJECTIVE  Pt reports being ready for PT RX    OBJECTIVE  Precautions: Bed/chair alarm    WEIGHT BEARING RESTRICTION  Weight Bearing Restriction: None                PAIN ASSESSMENT   Ratin  Location: N/A       BALANCE                                                                                                                       Static Sitting: Fair -  Dynamic Sitting: Fair -           Static Standing: Poor +  Dynamic Standing: Poor +    ACTIVITY TOLERANCE                         O2 WALK       AM-PAC '6-Clicks' INPATIENT SHORT FORM - BASIC MOBILITY  How much difficulty does the patient currently have. .. Patient Difficulty: Turning over in bed (including adjusting bedclothes, sheets and blankets)?: A Little   Patient Difficulty: Sitting down on and standing up from a chair with arms (e.g., wheelchair, bedside commode, etc.): A Little   Patient Difficulty: Moving from lying on back to sitting on the side of the bed?: A Little   How much help from another person does the patient currently need. .. Help from Another: Moving to and from a bed to a chair (including a wheelchair)?: A Little   Help from Another: Need to walk in hospital room?: A Little   Help from Another: Climbing 3-5 steps with a railing?: Total     AM-PAC Score:  Raw Score: 16   Approx Degree of Impairment: 54.16%   Standardized Score (AM-PAC Scale): 40.78   CMS Modifier (G-Code): CK      Additional information:     THERAPEUTIC EXERCISES  Lower Extremity Ankle pumps  Heel raises  Quad sets     Position Supine       Patient End of Session: Up in chair;Call light within reach;RN aware of session/findings;Bracing education provided per handout; All patient questions and concerns addressed    CURRENT GOALS       Patient Goal Patient's self-stated goal is: to be able to walk without SOB and fatigue   Goal #1 Patient is able to demonstrate supine - sit EOB @ level: modified independent     Goal #1   Current Status Min a   Goal #2 Patient is able to demonstrate transfers Sit to/from Stand at assistance level: modified independent with walker - rolling     Goal #2  Current Status Min a   Goal #3 Patient is able to ambulate 150 feet with assist device: walker - rolling at assistance level: modified independent   Goal #3   Current Status Pt amb 2 x 20 ft with RW and min a   Goal #4 Patient will negotiate 5 stairs/one curb w/ assistive device and supervision   Goal #4   Current Status NT   Goal #5 Patient to demonstrate independence with home activity/exercise instructions provided to patient in preparation for discharge.    Goal #5   Current Status In progress   Goal #6    Goal #6  Current Status

## 2022-04-04 NOTE — TELEPHONE ENCOUNTER
ERx sent for Duonebs with codes    Roxanol needs prior auth; will send to [facility] pharmacy

## 2022-04-04 NOTE — PLAN OF CARE
Problem: Patient Centered Care  Goal: Patient preferences are identified and integrated in the patient's plan of care  Description: Interventions:  - What would you like us to know as we care for you?  I live at home with my wife  - Provide timely, complete, and accurate information to patient/family  - Incorporate patient and family knowledge, values, beliefs, and cultural backgrounds into the planning and delivery of care  - Encourage patient/family to participate in care and decision-making at the level they choose  - Honor patient and family perspectives and choices  Outcome: Progressing     Problem: Patient/Family Goals  Goal: Patient/Family Long Term Goal  Description: Patient's Long Term Goal: walk without being out of breath    Interventions:  - Pulmonology on consult  -IV antibiotics  -IVF    - See additional Care Plan goals for specific interventions  Outcome: Progressing  Goal: Patient/Family Short Term Goal  Description: Patient's Short Term Goal: catch my breath    Interventions:     - O2 supplement as needed  - IV antibiotics  - Nebulizer treatment    - See additional Care Plan goals for specific interventions  Outcome: Progressing     Problem: RESPIRATORY - ADULT  Goal: Achieves optimal ventilation and oxygenation  Description: INTERVENTIONS:  - Assess for changes in respiratory status  - Assess for changes in mentation and behavior  - Position to facilitate oxygenation and minimize respiratory effort  - Oxygen supplementation based on oxygen saturation or ABGs  - Provide Smoking Cessation handout, if applicable  - Encourage broncho-pulmonary hygiene including cough, deep breathe, Incentive Spirometry  - Assess the need for suctioning and perform as needed  - Assess and instruct to report SOB or any respiratory difficulty  - Respiratory Therapy support as indicated  - Manage/alleviate anxiety  - Monitor for signs/symptoms of CO2 retention  Outcome: Progressing     Problem: PAIN - ADULT  Goal: Verbalizes/displays adequate comfort level or patient's stated pain goal  Description: INTERVENTIONS:  - Encourage pt to monitor pain and request assistance  - Assess pain using appropriate pain scale  - Administer analgesics based on type and severity of pain and evaluate response  - Implement non-pharmacological measures as appropriate and evaluate response  - Consider cultural and social influences on pain and pain management  - Manage/alleviate anxiety  - Utilize distraction and/or relaxation techniques  - Monitor for opioid side effects  - Notify MD/LIP if interventions unsuccessful or patient reports new pain  - Anticipate increased pain with activity and pre-medicate as appropriate  Outcome: Progressing     Problem: SAFETY ADULT - FALL  Goal: Free from fall injury  Description: INTERVENTIONS:  - Assess pt frequently for physical needs  - Identify cognitive and physical deficits and behaviors that affect risk of falls.   - [place] fall precautions as indicated by assessment.  - Educate pt/family on patient safety including physical limitations  - Instruct pt to call for assistance with activity based on assessment  - Modify environment to reduce risk of injury  - Provide assistive devices as appropriate  - Consider OT/PT consult to assist with strengthening/mobility  - Encourage toileting schedule  Outcome: Progressing     Problem: DISCHARGE PLANNING  Goal: Discharge to home or other facility with appropriate resources  Description: INTERVENTIONS:  - Identify barriers to discharge w/pt and caregiver  - Include patient/family/discharge partner in discharge planning  - Arrange for needed discharge resources and transportation as appropriate  - Identify discharge learning needs (meds, wound care, etc)  - Arrange for interpreters to assist at discharge as needed  - Consider post-discharge preferences of patient/family/discharge partner  - Complete POLST form as appropriate  - Assess patient's ability to be responsible for managing their own health  - Refer to Case Management Department for coordinating discharge planning if the patient needs post-hospital services based on physician/LIP order or complex needs related to functional status, cognitive ability or social support system  Outcome: Progressing     Problem: GASTROINTESTINAL - ADULT  Goal: Maintains or returns to baseline bowel function  Description: INTERVENTIONS:  - Assess bowel function  - Maintain adequate hydration with IV or PO as ordered and tolerated  - Evaluate effectiveness of GI medications  - Encourage mobilization and activity  - Obtain nutritional consult as needed  - Establish a toileting routine/schedule  - Consider collaborating with pharmacy to review patient's medication profile  Outcome: Progressing     Problem: HEMATOLOGIC - ADULT  Goal: Free from bleeding injury  Description: (Example usage: patient with low platelets)  INTERVENTIONS:  - Avoid intramuscular injections, enemas and rectal medication administration  - Ensure safe mobilization of patient  - Hold pressure on venipuncture sites to achieve adequate hemostasis  - Assess for signs and symptoms of internal bleeding  - Monitor lab trends  - Patient is to report abnormal signs of bleeding to staff  - Avoid use of toothpicks and dental floss  - Use electric shaver for shaving  - Use soft bristle tooth brush  - Limit straining and forceful nose blowing  Outcome: Progressing     Problem: MUSCULOSKELETAL - ADULT  Goal: Return mobility to safest level of function  Description: INTERVENTIONS:  - Assess patient stability and activity tolerance for standing, transferring and ambulating w/ or w/o assistive devices  - Assist with transfers and ambulation using safe patient handling equipment as needed  - Ensure adequate protection for wounds/incisions during mobilization  - Obtain PT/OT consults as needed  - Advance activity as appropriate  - Communicate ordered activity level and limitations with patient/family  Outcome: Progressing  Goal: Maintain proper alignment of affected body part  Description: INTERVENTIONS:  - Support and protect limb and body alignment per provider's orders  - Instruct and reinforce with patient and family use of appropriate assistive device and precautions (e.g. spinal or hip dislocation precautions)  Outcome: Progressing     Pt resting in chair. Denies pain or nausea. 4.5L O2 in use. Moist nonproductive cough continued. Remote tele in place. Gutierrez maintained-clear yellow output. Depends in use. Tolerating diet-fluid restriction continued. Bruise and edema to right arm and hand appear to be improving. Elevated B/P in AM, hydralazine given. Safety measures in place. Frequent rounding being done. Wife at bedside overnight.

## 2022-04-05 ENCOUNTER — TELEPHONE (OUTPATIENT)
Dept: INTERNAL MEDICINE CLINIC | Facility: CLINIC | Age: 87
End: 2022-04-05

## 2022-04-05 ENCOUNTER — PATIENT OUTREACH (OUTPATIENT)
Dept: CASE MANAGEMENT | Age: 87
End: 2022-04-05

## 2022-04-05 PROCEDURE — 1111F DSCHRG MED/CURRENT MED MERGE: CPT

## 2022-04-05 NOTE — TELEPHONE ENCOUNTER
Spoke to patient's wife Ignacio Dawson for TCM today. Pt does not have HFU appt scheduled at this time. TCM/HFU appt recommended by 4/18/2022. NCM attempted to schedule a TCM Bhavna Oh patient's wife states she is not taking Terra Trimble out of the house. Patient discharged home with [facility], Madifatimahkarishma Samzaina states that Hospice is coming out to see patient tomorrow. BOOK BY DATE (last date for TCM): 4/18/2022    TRIAGE:  Please notify Dr. Irwin Saldivar of the above, then please call patient's wife Ignacio Dawson with any further instructions. Thank you!

## 2022-04-05 NOTE — TELEPHONE ENCOUNTER
Spoke with [facility] pharmacist to inform them of Lung CA Dx; They were able to successfully run RX through.

## 2022-04-05 NOTE — PROGRESS NOTES
Patient alert and oriented X4, vitals stable. Denies pain. Tolerating diet. Patient cleared for discharge. Discharge instructions reviewed with patient wife an son at bedside, including need for follow up appointments; new, continued and discontinued medications; home oxygen care; restrepo and leg bag care; and when to seek medical attention. [facility] along with 2 oxygen tanks. All other belongings with family.

## 2022-04-05 NOTE — TELEPHONE ENCOUNTER
[facility] requesting Alternative Morphine Sulf 100 mg / 5 ml   Alternative requested: need PA if more than 7 days unless pt is Hospice or cancer patient.  Please call pharmacy

## 2022-04-22 NOTE — TELEPHONE ENCOUNTER
Short taper sent 4/4/22, mychart sent to pt inquiring why this is being requested, asking for update.      To Carito Hammond also per protocol; awaiting response/update from pt

## 2022-04-25 ENCOUNTER — TELEPHONE (OUTPATIENT)
Dept: INTERNAL MEDICINE CLINIC | Facility: CLINIC | Age: 87
End: 2022-04-25

## 2022-04-25 NOTE — TELEPHONE ENCOUNTER
Fax received from [facility] for   Meclizine Rx Tab 25 mg     Please call [phone]  Patient ID# [id number]    In purple folder

## 2022-04-27 RX ORDER — PREDNISONE 10 MG/1
TABLET ORAL
Qty: 25 TABLET | Refills: 0 | OUTPATIENT
Start: 2022-04-27

## 2022-06-02 NOTE — TELEPHONE ENCOUNTER
To Dr. Mervat Arnold to please advise------------  Pt calling, has been taking Ceftin for 9 days, end date: 3/17/22. CXR done 3/11 radiologist notes: \"right upper lobe pneumonia\". Pt reports persistent productive cough with yellow sputum. Denies worsening SOB or dyspnea on exertion. Denies fever, chills, body aches, sore throat. Reports sputum was green and some hemoptysis noted by pt when abx started 3/7/22, now sputum is yellow in color.      [facility]: [facility] [address] [place] [address], [place] [phone], supine in bed. NAD. A&O x 4.

## (undated) DIAGNOSIS — R09.02 HYPOXIA: ICD-10-CM

## (undated) DIAGNOSIS — R06.00 DYSPNEA, UNSPECIFIED TYPE: ICD-10-CM

## (undated) DIAGNOSIS — I48.91 ATRIAL FIBRILLATION, UNSPECIFIED TYPE (HCC): ICD-10-CM

## (undated) DIAGNOSIS — R19.7 DIARRHEA OF PRESUMED INFECTIOUS ORIGIN: ICD-10-CM

## (undated) DIAGNOSIS — I10 ESSENTIAL HYPERTENSION: Primary | ICD-10-CM

## (undated) DIAGNOSIS — Z02.9 ENCOUNTERS FOR UNSPECIFIED ADMINISTRATIVE PURPOSE: ICD-10-CM

## (undated) DIAGNOSIS — Z71.89 GOALS OF CARE, COUNSELING/DISCUSSION: ICD-10-CM

## (undated) DIAGNOSIS — J18.9 PNEUMONIA OF RIGHT UPPER LOBE DUE TO INFECTIOUS ORGANISM: ICD-10-CM

## (undated) DIAGNOSIS — J18.9 PNEUMONIA OF RIGHT UPPER LOBE DUE TO INFECTIOUS ORGANISM: Primary | ICD-10-CM

## (undated) DIAGNOSIS — R42 VERTIGO: ICD-10-CM

## (undated) DIAGNOSIS — R91.8 LUNG MASS: ICD-10-CM

## (undated) DIAGNOSIS — C34.90 PRIMARY MALIGNANT NEOPLASM OF LUNG METASTATIC TO OTHER SITE, UNSPECIFIED LATERALITY (HCC): ICD-10-CM

## (undated) DIAGNOSIS — C7A.8 NEUROENDOCRINE CARCINOMA OF LUNG (HCC): ICD-10-CM

## (undated) DIAGNOSIS — E87.1 HYPONATREMIA: ICD-10-CM

## (undated) NOTE — LETTER
[facility], [place]  Authorization for Invasive Procedures  1. I hereby authorize Dr. Camilo Tavera, my physician and whomever may be designated as the doctor's assistant, to perform the following operation and/or procedure:  Ultrasound guided supraclavicular lymph node biopsy on Melissa Warren at [facility].    2. My physician has explained to me the nature and purpose of the operation or other procedure, possible alternative methods of treatment, the risks involved and the possibility of complications to me. I understand the probable consequences of declining the recommended procedure and the alternative methods of treatment. I acknowledge that no guarantee has been made as to the result that may be obtained. 3. I recognize that during the course of this operation or other procedure, unforeseen conditions may necessitate additional or different procedures than those listed above. I, therefore, further authorize and request that the above-named physician, his/her physician assistants, or designees perform such procedures as are, in his/her professional opinion, necessary and desirable. If I have a Do Not Attempt Resuscitation (DNAR) order in place, that status will be suspended while in the operating room, procedural suite, and during the recovery period unless otherwise explicitly stated by me (or a person authorized to consent on my behalf). The surgeon or my attending physician will determine when the applicable recovery period ends for purposes of reinstating the DNAR order. 4. Should the need arise during my operation or immediate post-operative period; I also consent to the administration of blood and/or blood products.  Further, I understand that despite careful testing and screening of blood and blood products, I may still be subject to ill effects as a result of recieving a blood transfusion an/or blood producst. The following are some, but not all, of the potential risks that can occur: fever and allergic reactions, hemolytic reactions, transmission of disease such as hepatitis, AIDS, cytomegalovirus (CMV), and flluid overload. In the event that I wish to have autologous transfusions of my own blood, or a directed donor transfusion, I will discuss this with my physician. 5. I consent to the photographing of the operations or procedures to be performed for the purposes of advancing medicine, science, and/or education, provided my identity is not revealed. If the procedure has been videotaped, the physician/surgeon will obtain the original videotape. [facility] will not be responsible for storage or maintenance of this tape. 6. I consent to the presence of a  or observer as deemed necessary by my physician or his designee. 7. Any tissues or organs removed in the operation or other procedure may be disposed of by and at the discretion of [facility].    8. I understand that the physician and his/her physician assistants may not be employees or agents of [facility], [facility], nor [facility], but are independent medical practitioners who have been permitted to use its facilities for the care and treatment of their patients. 9. Patients having a sterilization procedure: I understand that if the procedure is successful the results will be permanent and it will therefore be impossible for me to inseminate, conceive or bear children. I also understand that the procedure is intended to result in sterility, although the result has not been guaranteed. 10. I CERTIFY THAT I HAVE READ AND FULLY UNDERSTAND THE ABOVE CONSENT TO OPERATION and/or OTHER PROCEDURE. 11. I acknowledge that my physician has explained sedation/analgesia administration to me including the risks and benefits.  I consent to the administration of sedation/analgesia as may be necessary or desirable in the judgment of my physician. Signature of Patient:  ________________________________________________ Date: _________Time: _________    Responsible person in case of minor or unconscious: _____________________________Relationship: ____________     Witness Signature: ____________________________________________ Date: __________ Time: ___________    Statement of Physician  My signature below affirms that prior to the time of the procedure, I have explained to the patient and/or his legal representative, the risks and benefits involved in the proposed treatment and any reasonable alternative to the proposed treatment. I have also explained the risks and benefits involved in the refusal of the proposed treatment and have answered the patient's questions. If I have a significant financial interest in this procedure/surgery, I have disclosed this and had a discussion with my patient.     Signature of Physician:   ________________________________________Date: _________Time:_______ Patient Name: Corazon Schmidt  : 10/27/1935   Printed: 2022    Medical Record #: [id number]

## (undated) NOTE — LETTER
[facility]  Administration of Anesthesia  1. Olga Lidia Art, or _________________________________ acting on his behalf, (Patient) (Dependent/Representative) request to receive anesthesia for my pending procedure/operation/treatment. A physician (anesthesiologist) alone or an anesthesiologist working with a nurse anesthetist may administer my anesthesia. 2. I understand that my anesthesiologist is not an employee or agent of the hospital, but is an independent medical practitioner who has been permitted to use its facilities for the care and treatment of his/her patients. 3. I acknowledge that a physician from [facility] or their designate(s), recommended anesthesia for me using her/his medical judgment. The type(s) of anesthesia I may receive include:                a) General Anesthesia, b) Spinal/Epidural Anesthesia, c) Regional Anesthesia or d) Monitored Anesthesia Care. 4. If my spinal, regional or monitored anesthesia care (local) is not satisfactory for my comfort, or if my medical condition requires, I consent to the administration of general anesthesia. 5. I am aware that the practice of anesthesiology is not an exact science and that some foreseeable risks or consequences may occur. Some common risks/consequences include sore throat and hoarseness, nausea and vomiting, muscle soreness, backache, damage to the mouth/teeth/vocal cords and eye injury. I understand that more rare but serious potential risks of anesthesia include blood pressure changes, drug reactions, cardiac arrest, brain damage, paralysis or death. These risks apply to whether I have general, spinal/epidural, regional or monitored anesthesia care. 6. OBSTETRIC PATIENTS: Specific risks/consequences of spinal/epidural anesthesia may include itching, low blood pressure, difficulty urinating, slowing of the baby's heart rate and headache.  Rare risks include infections, high spinal block, spinal bleeding, seizure, cardiac arrest and death. 7. AWARENESS: I understand that it is possible (but unlikely) to have explicit memory of events from the operating room while under general anesthesia. 8. ELECTROCONVULSIVE THERAPY PATIENTS: This consent serves for all treatments in a single course of therapy. 9. I understand that I must inform my anesthesiologist when I last ate and/or drank to minimize the risk of anesthesia. 10. If I am pregnant, or may pregnant, I understand that elective surgery should be postponed until after the baby is born. Anesthetics cross the placenta and may temporarily anesthetize the baby. Although fetal complications of anesthesia during pregnancy are rare, they may include birth defects, premature labor, brain damage and death. 11. I certify that I informed the anesthesiologist, to the best of my ability, about medication I take including blood thinners, anticoagulants, herbal remedies, narcotics and recreational drugs (e.g. cocaine, marijuana, PCP). Failure to inform my anesthesiologist about these medicines may increase my risk of anesthetic complications. The nature and purpose of my anesthetic management was explained to me. I had the opportunity to ask questions and the answers and information provided meet my satisfaction.   I retain the right to withdraw this consent at any time prior to the administration of said anesthetic.    ___________________________________________________           _____________________________________________________  Patient Signature                                                                                      Witness Signature                ___________________________________________________           _____________________________________________________  Date/Time                                                                                               Responsible person in case of minor/ unconscious pt /Relationship    My signature below affirms that prior to the time of the procedure, I have explained to the patient and/or his/her guardian, the risks and benefits of undergoing anesthesia, as well as any reasonable alternatives.     ___________________________________________________            _____________________________________________________  Physician Signature                            Date/Time  Patient Name: Cristi Johnson     : 10/27/1935     Printed: 3/28/2022      Medical Record #: [id number]                              Page 1 of 1    ----------ANESTHESIA CONSENT----------

## (undated) NOTE — Clinical Note
HASMUKH, TCM call made, see notes. [facility] attempted to schedule a TCM HFU, Ravindra Ho states she is not taking Leonor Richardson out of the house. Patient discharged home with [facility] and Palliative Care, Ravindra Ho states that Hospice is coming out to see patient tomorrow. Message sent to MD's office.

## (undated) NOTE — LETTER
[address] [place], [place]      Authorization for Surgical Operation and Procedure     Date:___________                                                                                                         Time:__________  1. I hereby Yolande Palacio MD, my physician and his/her assistants (if applicable), which may include medical students, residents, and/or fellows, to perform the following surgical operation/ procedure and administer such anesthesia as may be determined necessary by my physician:  Operation/Procedure name (s) BRONCHOSCOPY  on Bibiana Silk   2. I recognize that during the surgical operation/procedure, unforeseen conditions may necessitate additional or different procedures than those listed above. I, therefore, further authorize and request that the above-named surgeon, assistants, or designees perform such procedures as are, in their judgment, necessary and desirable. 3.   My surgeon/physician has discussed prior to my surgery the potential benefits, risks and side effects of this procedure; the likelihood of achieving goals; and potential problems that might occur during recuperation. They also discussed reasonable alternatives to the procedure, including risks, benefits, and side effects related to the alternatives and risks related to not receiving this procedure. I have had all my questions answered and I acknowledge that no guarantee has been made as to the result that may be obtained. 4.   Should the need arise during my operation or immediate post-operative period, I also consent to the administration of blood and/or blood products. Further, I understand that despite careful testing and screening of blood or blood products by collecting agencies, I may still be subject to ill effects as a result of receiving a blood transfusion and/or blood products.   The following are some, but not all, of the potential risks that can occur: fever and allergic reactions, hemolytic reactions, transmission of diseases such as Hepatitis, AIDS and Cytomegalovirus (CMV) and fluid overload. In the event that I wish to have an autologous transfusion of my own blood, or a directed donor transfusion. I will discuss this with my physician. 5.   I authorize the use of any specimen, organs, tissues, body parts or foreign objects that may be removed from my body during the operation/procedure for diagnosis, research or teaching purposes and their subsequent disposal by hospital authorities. I also authorize the release of specimen test results and/or written reports to my treating physician on the hospital medical staff or other referring or consulting physicians involved in my care, at the discretion of the Pathologist or my treating physician. 6.   I consent to the photographing or videotaping of the operations or procedures to be performed, including appropriate portions of my body for medical, scientific, or educational purposes, provided my identity is not revealed by the pictures or by descriptive texts accompanying them. If the procedure has been photographed/videotaped, the surgeon will obtain the original picture, image, videotape or CD. The hospital will not be responsible for storage, release or maintenance of the picture, image, tape or CD.    7.   I consent to the presence of a  or observers in the operating room as deemed necessary by my physician or their designees. 8.   I recognize that in the event my procedure results in extended X-Ray/fluoroscopy time, I may develop a skin reaction. 9. If I have a Do Not Attempt Resuscitation (DNAR) order in place, that status will be suspended while in the operating room, procedural suite, and during the recovery period unless otherwise explicitly stated by me (or a person authorized to consent on my behalf).  The surgeon or my attending physician will determine when the applicable recovery period ends for purposes of reinstating the DNAR order. 10. Patients having a sterilization procedure: I understand that if the procedure is successful the results will be permanent and it will therefore be impossible for me to inseminate, conceive, or bear children. I also understand that the procedure is intended to result in sterility, although the result has not been guaranteed. 11. I acknowledge that my physician has explained sedation/analgesia administration to me including the risk and benefits I consent to the administration of sedation/analgesia as may be necessary or desirable in the judgment of my physician. I CERTIFY THAT I HAVE READ AND FULLY UNDERSTAND THE ABOVE CONSENT TO OPERATION and/or OTHER PROCEDURE.    _________________________________________  __________________________________  Signature of Patient     Signature of Responsible Person         ___________________________________         Printed Name of Responsible Person           _________________________________                  Relationship to Patient  _________________________________________  ______________________________  Signature of Witness          Date  Time    STATEMENT OF PHYSICIAN My signature below affirms that prior to the time of the procedure; I have explained to the patient and/or his/her legal representative, the risks and benefits involved in the proposed treatment and any reasonable alternative to the proposed treatment. I have also explained the risks and benefits involved in refusal of the proposed treatment and alternatives to the proposed treatment and have answered the patient's questions. If I have a significant financial interest in a co-management agreement or a significant financial interest in any product or implant, or other significant relationship used in this procedure/surgery, I have disclosed this and had a discussion with my patient. _______________________________________________________________ _____________________________  Ghada Sal of Physician)                                                                                         (Date)                                   (Time)        Patient Name: Darren Monterroso    : 10/27/1935   Printed: 3/28/2022      Medical Record #: [id number]                                              Page 1 of 1

## (undated) NOTE — MR AVS SNAPSHOT
[facility] [place]  [facility] [place] [address]  [phone]               Thank you for choosing us for your health care visit with Tamiko Landrum MD.  We are glad to serve you and happy to provide you with this summary of your visit.   Ple This list is accurate as of: 5/12/17 11:01 AM.  Always use your most recent med list.                Albuterol Sulfate  (90 Base) MCG/ACT Aers   Inhale 2 puffs into the lungs every 6 (six) hours as needed.  For wheezing   What changed:  See the new i Tips for making healthy food choices  -   Enjoy your food, but eat less. Fully enjoy your food when eating. Don’t eat while distracted and slow down. Avoid over sized portions. Don’t eat while when you’re bored.      EAT THESE FOODS MORE OFTEN: EAT T

## (undated) NOTE — LETTER
[address], [place]  Authorization for Invasive Procedures  1. I hereby authorize Dr. Valeri Delaney , my physician and whomever may be designated as the doctor's assistant, to perform the following operation and/or procedure:  thoracentesis on Char Shah at [facility].    2. My physician has explained to me the nature and purpose of the operation or other procedure, possible alternative methods of treatment, the risks involved and the possibility of complications to me. I understand the probable consequences of declining the recommended procedure and the alternative methods of treatment. I acknowledge that no guarantee has been made as to the result that may be obtained. 3. I recognize that during the course of this operation or other procedure, unforeseen conditions may necessitate additional or different procedures than those listed above. I, therefore, further authorize and request that the above-named physician, his/her physician assistants, or designees perform such procedures as are, in his/her professional opinion, necessary and desirable. If I have a Do Not Attempt Resuscitation (DNAR) order in place, that status will be suspended while in the operating room, procedural suite, and during the recovery period unless otherwise explicitly stated by me (or a person authorized to consent on my behalf). The surgeon or my attending physician will determine when the applicable recovery period ends for purposes of reinstating the DNAR order. 4. Should the need arise during my operation or immediate post-operative period; I also consent to the administration of blood and/or blood products.  Further, I understand that despite careful testing and screening of blood and blood products, I may still be subject to ill effects as a result of recieving a blood transfusion an/or blood producst. The following are some, but not all, of the potential risks that can occur: fever and allergic reactions, hemolytic reactions, transmission of disease such as hepatitis, AIDS, cytomegalovirus (CMV), and flluid overload. In the event that I wish to have autologous transfusions of my own blood, or a directed donor transfusion, I will discuss this with my physician. 5. I consent to the photographing of the operations or procedures to be performed for the purposes of advancing medicine, science, and/or education, provided my identity is not revealed. If the procedure has been videotaped, the physician/surgeon will obtain the original videotape. The hospital will not be responsible for storage or maintenance of this tape. 6. I consent to the presence of a  or observer as deemed necessary by my physician or his designee. 7. Any tissues or organs removed in the operation or other procedure may be disposed of by and at the discretion of [facility].    8. I understand that the physician and his/her physician assistants may not be employees or agents of [facility], [facility], [facility], but are independent medical practitioners who have been permitted to use its facilities for the care and treatment of their patients. 9. Patients having a sterilization procedure: I understand that if the procedure is successful the results will be permanent and it will therefore be impossible for me to inseminate, conceive or bear children. I also understand that the procedure is intended to result in sterility, although the result has not been guaranteed. 10. I CERTIFY THAT I HAVE READ AND FULLY UNDERSTAND THE ABOVE CONSENT TO OPERATION and/or OTHER PROCEDURE. 11. I acknowledge that my physician has explained sedation/analgesia administration to me including the risks and benefits. I consent to the administration of sedation/analgesia as may be necessary or desirable in the judgment of my physician.      Signature of Patient:  ________________________________________________ Date: _________Time: _________    Responsible person in case of minor or unconscious: _____________________________Relationship: ____________     Witness Signature: ____________________________________________ Date: __________ Time: ___________    Statement of Physician  My signature below affirms that prior to the time of the procedure, I have explained to the patient and/or his legal representative, the risks and benefits involved in the proposed treatment and any reasonable alternative to the proposed treatment. I have also explained the risks and benefits involved in the refusal of the proposed treatment and have answered the patient's questions. If I have a significant financial interest in this procedure/surgery, I have disclosed this and had a discussion with my patient.     Signature of Physician:   ________________________________________Date: _________Time:_______ Patient Name: Suzan Wiggins  : 10/27/1935   Printed: 2022    Medical Record #: [id number]

## (undated) NOTE — IP AVS SNAPSHOT
[facility]            (For Outpatient Use Only) Initial Admit Date: 3/24/2022   Inpt/Obs Admit Date: Inpt: 3/24/22 / Obs: N/A   Discharge Date:    Hallie Clemonsing:  [de-identified]   MRN: [de-identified]   CSN: [id number]   CEID: [id number]        ENCOUNTER  Patient Class: Inpatient Admitting Provider: Vinh Roy MD Unit: [address]//SE   Hospital Service: Oncology Attending Provider: Mayank Campos MD   Bed: 458-A   Visit Type:   Referring Physician: No ref. provider found Billing Flag:    Admit Diagnosis: Hypoxia [R09.02]      PATIENT  Legal Name:   Samy Mcmahon   Legal Sex: Male  Gender ID: Male             [address] [facility] Name:   Adriane Johnson PCP:  Luz Maria Vitale MD Home: [phone]   Address:  [facility] : 10/27/1935 (86 yrs) Mobile: [phone]         City/State/Zip: [place] [facility] [address] Marital:  Language: [place] park: Akhil SSN4: [id number] Shinto: [address]*     Race: White Ethnicity: Non  Or [address] [place] [address]   Name Relationship Legal Guardian? Home Phone Work Phone Mobile Phone   1.  Anat Sandoval  2. *No Contact Specified* Spouse      [phone]       GUARANTOR  Guarantor: Hiram Duenas : 10/27/1935 Home Phone: [phone]   Address: [facility]  Sex: Male Work Phone:    City/State/Zip: [address] [place] [address]   Rel. to Patient: Self Guarantor ID: [id number]   [address]   Employer:  Status: RETIRED     COVERAGE  PRIMARY INSURANCE   Payor: MEDICARE Plan: MEDICARE PART A&B   Group Number:  Insurance Type: INDEMNITY   Subscriber Name: Donna Woods : 10/27/1935   Subscriber ID: [id number] Pt Rel to Subscriber: Self   SECONDARY INSURANCE   Payor: [facility] INDEMNITY Plan: [address] [place] [address] INDEMNITY   Group Number: [id number] Insurance Type: [id number] Name: Donna Woods : 10/27/1935   Subscriber ID: [id number] Pt Rel to Subscriber: SELF   TERTIARY INSURANCE   Payor: Plan:    Group Number:  Insurance Type:    Subscriber Name:  Subscriber :    Subscriber ID:  Pt Rel to Subscriber:    Hospital Account Financial Class: Medicare    2022

## (undated) NOTE — LETTER
[facility], [place]  Authorization for Invasive Procedures  1. I hereby authorize Dr. Christophe Manjarrez , my physician and whomever may be designated as the doctor's assistant, to perform the following operation and/or procedure:  *** on Vale Moon at [facility].    2. My physician has explained to me the nature and purpose of the operation or other procedure, possible alternative methods of treatment, the risks involved and the possibility of complications to me. I understand the probable consequences of declining the recommended procedure and the alternative methods of treatment. I acknowledge that no guarantee has been made as to the result that may be obtained. 3. I recognize that during the course of this operation or other procedure, unforeseen conditions may necessitate additional or different procedures than those listed above. I, therefore, further authorize and request that the above-named physician, his/her physician assistants, or designees perform such procedures as are, in his/her professional opinion, necessary and desirable. If I have a Do Not Attempt Resuscitation (DNAR) order in place, that status will be suspended while in the operating room, procedural suite, and during the recovery period unless otherwise explicitly stated by me (or a person authorized to consent on my behalf). The surgeon or my attending physician will determine when the applicable recovery period ends for purposes of reinstating the DNAR order. 4. Should the need arise during my operation or immediate post-operative period; I also consent to the administration of blood and/or blood products.  Further, I understand that despite careful testing and screening of blood and blood products, I may still be subject to ill effects as a result of recieving a blood transfusion an/or blood producst. The following are some, but not all, of the potential risks that can occur: fever and allergic reactions, hemolytic reactions, transmission of disease such as hepatitis, AIDS, cytomegalovirus (CMV), and flluid overload. In the event that I wish to have autologous transfusions of my own blood, or a directed donor transfusion, I will discuss this with my physician. 5. I consent to the photographing of the operations or procedures to be performed for the purposes of advancing medicine, science, and/or education, provided my identity is not revealed. If the procedure has been videotaped, the physician/surgeon will obtain the original videotape. The hospital will not be responsible for storage or maintenance of this tape. 6. I consent to the presence of a  or observer as deemed necessary by my physician or his designee. 7. Any tissues or organs removed in the operation or other procedure may be disposed of by and at the discretion of [facility].    8. I understand that the physician and his/her physician assistants may not be employees or agents of [facility], [facility], [address], but are independent medical practitioners who have been permitted to use its facilities for the care and treatment of their patients. 9. Patients having a sterilization procedure: I understand that if the procedure is successful the results will be permanent and it will therefore be impossible for me to inseminate, conceive or bear children. I also understand that the procedure is intended to result in sterility, although the result has not been guaranteed. 10. I CERTIFY THAT I HAVE READ AND FULLY UNDERSTAND THE ABOVE CONSENT TO OPERATION and/or OTHER PROCEDURE. 11. I acknowledge that my physician has explained sedation/analgesia administration to me including the risks and benefits. I consent to the administration of sedation/analgesia as may be necessary or desirable in the judgment of my physician.      Signature of Patient: ________________________________________________ Date: _________Time: _________    Responsible person in case of minor or unconscious: _____________________________Relationship: ____________     Witness Signature: ____________________________________________ Date: __________ Time: ___________    Statement of Physician  My signature below affirms that prior to the time of the procedure, I have explained to the patient and/or his legal representative, the risks and benefits involved in the proposed treatment and any reasonable alternative to the proposed treatment. I have also explained the risks and benefits involved in the refusal of the proposed treatment and have answered the patient's questions. If I have a significant financial interest in this procedure/surgery, I have disclosed this and had a discussion with my patient.     Signature of Physician:   ________________________________________Date: _________Time:_______ Patient Name: Farrah Gaffney  : 10/27/1935   Printed: 2022    Medical Record #: [id number]